# Patient Record
Sex: FEMALE | Race: OTHER | Employment: FULL TIME | URBAN - METROPOLITAN AREA
[De-identification: names, ages, dates, MRNs, and addresses within clinical notes are randomized per-mention and may not be internally consistent; named-entity substitution may affect disease eponyms.]

---

## 2020-09-25 ENCOUNTER — HOSPITAL ENCOUNTER (EMERGENCY)
Age: 40
Discharge: HOME | End: 2020-09-25
Attending: EMERGENCY MEDICINE
Payer: MEDICAID

## 2020-09-25 VITALS
TEMPERATURE: 98.2 F | SYSTOLIC BLOOD PRESSURE: 143 MMHG | DIASTOLIC BLOOD PRESSURE: 98 MMHG | WEIGHT: 178.57 LBS | OXYGEN SATURATION: 99 % | HEART RATE: 90 BPM | RESPIRATION RATE: 18 BRPM

## 2020-09-25 DIAGNOSIS — T78.40XA ALLERGIC REACTION, INITIAL ENCOUNTER: Primary | ICD-10-CM

## 2020-09-25 PROCEDURE — 74011636637 HC RX REV CODE- 636/637: Performed by: EMERGENCY MEDICINE

## 2020-09-25 PROCEDURE — 99283 EMERGENCY DEPT VISIT LOW MDM: CPT

## 2020-09-25 RX ORDER — EPINEPHRINE 0.3 MG/.3ML
0.3 INJECTION SUBCUTANEOUS
Qty: 1 SYRINGE | Refills: 0 | Status: SHIPPED | OUTPATIENT
Start: 2020-09-25 | End: 2020-09-25

## 2020-09-25 RX ORDER — PREDNISONE 20 MG/1
60 TABLET ORAL
Status: COMPLETED | OUTPATIENT
Start: 2020-09-25 | End: 2020-09-25

## 2020-09-25 RX ORDER — DIPHENHYDRAMINE HCL 25 MG
25 CAPSULE ORAL
Qty: 20 CAP | Refills: 0 | Status: SHIPPED | OUTPATIENT
Start: 2020-09-25 | End: 2020-10-05

## 2020-09-25 RX ORDER — PREDNISONE 20 MG/1
40 TABLET ORAL
Qty: 10 TAB | Refills: 0 | Status: SHIPPED | OUTPATIENT
Start: 2020-09-25 | End: 2020-09-30

## 2020-09-25 RX ADMIN — PREDNISONE 60 MG: 20 TABLET ORAL at 22:27

## 2020-09-26 NOTE — ED NOTES
Patient given discharge papers and instructions by primary RN. Patient verbalized understanding and stated not having any questions or concerns regarding her care. Patient ambulatory out of ED with friend.

## 2020-09-26 NOTE — ED PROVIDER NOTES
39-year-old female presenting to the ER with allergic reaction likely to kiwi fruit. Patient reports that she ate kiwi around 8 PM and approximately 10 to 15 minutes later started having symptoms. Patient reports itchiness and throat tingling. Reports facial flushing and skin itchiness. patient also reported nausea and some abdominal cramping. No vomiting or diarrhea. Patient reports some mild shortness of breath. Patient initially took an Guerraview and then called her doctor's nursing line. They advised her to take Benadryl. Patient took 50 mg of Benadryl and 30 minutes afterwards symptoms started to resolve. At this time patient reports itchiness and facial flushing resolved. Nausea abdominal cramping resolved. Shortness of breath and throat tingling resolved. No history of allergic reactions previously. Of note when patient was having facial flushing her daughter checked her temperature temporally and it read 100.5 °F.  Patient denied any preceding symptoms. And no recent fevers chills URI-like symptoms muscle aches dysuria or cough before the onset of lower allergic reaction  Temperature in the ER 98.2 °F patient had not taken any antipyretic medications. Past Medical History:   Diagnosis Date    Anemia        Past Surgical History:   Procedure Laterality Date    HX TUBAL LIGATION           History reviewed. No pertinent family history.     Social History     Socioeconomic History    Marital status:      Spouse name: Not on file    Number of children: Not on file    Years of education: Not on file    Highest education level: Not on file   Occupational History    Not on file   Social Needs    Financial resource strain: Not on file    Food insecurity     Worry: Not on file     Inability: Not on file    Transportation needs     Medical: Not on file     Non-medical: Not on file   Tobacco Use    Smoking status: Never Smoker    Smokeless tobacco: Never Used   Substance and Sexual Activity    Alcohol use: Yes    Drug use: Never    Sexual activity: Not on file   Lifestyle    Physical activity     Days per week: Not on file     Minutes per session: Not on file    Stress: Not on file   Relationships    Social connections     Talks on phone: Not on file     Gets together: Not on file     Attends Yarsanism service: Not on file     Active member of club or organization: Not on file     Attends meetings of clubs or organizations: Not on file     Relationship status: Not on file    Intimate partner violence     Fear of current or ex partner: Not on file     Emotionally abused: Not on file     Physically abused: Not on file     Forced sexual activity: Not on file   Other Topics Concern    Not on file   Social History Narrative    Not on file         ALLERGIES: Patient has no known allergies. Review of Systems   Constitutional: Negative for chills and fever. HENT: Positive for rhinorrhea, trouble swallowing and voice change. Negative for congestion and sore throat. Eyes: Negative for pain. Respiratory: Positive for shortness of breath. Cardiovascular: Negative for chest pain. Gastrointestinal: Positive for abdominal pain (cramping) and nausea. Negative for diarrhea and vomiting. Genitourinary: Negative for dysuria and flank pain. Musculoskeletal: Negative for back pain and neck pain. Skin: Positive for rash. Neurological: Negative for dizziness and headaches. Vitals:    09/25/20 2204   BP: (!) 143/98   Pulse: 90   Resp: 18   Temp: 98.2 °F (36.8 °C)   SpO2: 99%   Weight: 81 kg (178 lb 9.2 oz)            Physical Exam  Vitals signs and nursing note reviewed. Constitutional:       Appearance: She is well-developed. HENT:      Head: Normocephalic. Right Ear: Tympanic membrane, ear canal and external ear normal.      Left Ear: Tympanic membrane, ear canal and external ear normal.      Mouth/Throat:      Lips: Pink.       Mouth: Mucous membranes are moist.      Pharynx: Oropharynx is clear. No pharyngeal swelling or uvula swelling. Eyes:      Conjunctiva/sclera: Conjunctivae normal.   Neck:      Musculoskeletal: Normal range of motion and neck supple. Cardiovascular:      Rate and Rhythm: Normal rate and regular rhythm. Pulmonary:      Effort: Pulmonary effort is normal. No prolonged expiration or respiratory distress. Breath sounds: Normal breath sounds. No stridor. No wheezing. Abdominal:      General: Bowel sounds are normal.      Palpations: Abdomen is soft. Tenderness: There is no abdominal tenderness. Musculoskeletal: Normal range of motion. Skin:     General: Skin is warm. Capillary Refill: Capillary refill takes less than 2 seconds. Findings: No rash. Neurological:      Mental Status: She is alert and oriented to person, place, and time. Comments: No gross motor or sensory deficits          MDM  Number of Diagnoses or Management Options  Allergic reaction, initial encounter:   Diagnosis management comments: Patient presented with allergic reaction which is resolving after ministration of Benadryl at home. Allergic action likely to kiwi. At this time patient has no symptoms. Will start patient on steroids to prevent recurrence of her allergic reaction symptoms. Given follow-up information for allergist.  Advised to take Benadryl as needed for recurrent symptoms. Also gave prescription for an EpiPen due to the description of severity of symptoms before the Benadryl. This report of temperature 100.5 °F that resolved without anti-antipyretics. Patient is reporting facial flushing social with allergic reaction and they used temporal thermometer. This may have been false reading. As patient has normal temperature here and had no preceding symptoms before allergic reaction. No further evaluation for this needed. Discussed the discharge impression and any labs and the results with the patient.  Answered any questions and addressed any concerns. Discussed the importance of following up with their primary care provider and/or specialist.  Discussed signs or symptoms that would warrant return back to the ER for further evaluation. The patient is agreeable with discharge.            Procedures

## 2020-09-26 NOTE — ED TRIAGE NOTES
Patient arrives with c/o allergic reaction possibly to kiwi around 8pm. Reports rash and itchiness and was having SOB. Patient called the nurse line and was told to take 2 benadryl. States she feels better but still has the rash and itchiness. Also c/o fever 100.5, no fever in ED during triage and no meds taken at home for fever.

## 2021-12-06 ENCOUNTER — APPOINTMENT (OUTPATIENT)
Dept: CT IMAGING | Age: 41
End: 2021-12-06
Attending: PHYSICIAN ASSISTANT
Payer: COMMERCIAL

## 2021-12-06 ENCOUNTER — HOSPITAL ENCOUNTER (EMERGENCY)
Age: 41
Discharge: HOME | End: 2021-12-06
Attending: EMERGENCY MEDICINE
Payer: COMMERCIAL

## 2021-12-06 ENCOUNTER — APPOINTMENT (OUTPATIENT)
Dept: VASCULAR SURGERY | Age: 41
End: 2021-12-06
Attending: PHYSICIAN ASSISTANT
Payer: COMMERCIAL

## 2021-12-06 VITALS
TEMPERATURE: 98 F | OXYGEN SATURATION: 100 % | BODY MASS INDEX: 29.02 KG/M2 | DIASTOLIC BLOOD PRESSURE: 87 MMHG | HEART RATE: 92 BPM | HEIGHT: 64 IN | SYSTOLIC BLOOD PRESSURE: 140 MMHG | RESPIRATION RATE: 20 BRPM | WEIGHT: 170 LBS

## 2021-12-06 DIAGNOSIS — S86.811A STRAIN OF CALF MUSCLE, RIGHT, INITIAL ENCOUNTER: Primary | ICD-10-CM

## 2021-12-06 LAB
ALBUMIN SERPL-MCNC: 3.5 G/DL (ref 3.5–5)
ALBUMIN/GLOB SERPL: 1 {RATIO} (ref 1.1–2.2)
ALP SERPL-CCNC: 79 U/L (ref 45–117)
ALT SERPL-CCNC: 65 U/L (ref 12–78)
ANION GAP SERPL CALC-SCNC: 6 MMOL/L (ref 5–15)
AST SERPL-CCNC: 38 U/L (ref 15–37)
BASOPHILS # BLD: 0.1 K/UL (ref 0–0.1)
BASOPHILS NFR BLD: 1 % (ref 0–1)
BILIRUB SERPL-MCNC: 0.3 MG/DL (ref 0.2–1)
BUN SERPL-MCNC: 10 MG/DL (ref 6–20)
BUN/CREAT SERPL: 17 (ref 12–20)
CALCIUM SERPL-MCNC: 9.1 MG/DL (ref 8.5–10.1)
CHLORIDE SERPL-SCNC: 107 MMOL/L (ref 97–108)
CO2 SERPL-SCNC: 26 MMOL/L (ref 21–32)
CREAT SERPL-MCNC: 0.58 MG/DL (ref 0.55–1.02)
DIFFERENTIAL METHOD BLD: NORMAL
EOSINOPHIL # BLD: 0.2 K/UL (ref 0–0.4)
EOSINOPHIL NFR BLD: 2 % (ref 0–7)
ERYTHROCYTE [DISTWIDTH] IN BLOOD BY AUTOMATED COUNT: 13.2 % (ref 11.5–14.5)
GLOBULIN SER CALC-MCNC: 3.6 G/DL (ref 2–4)
GLUCOSE SERPL-MCNC: 100 MG/DL (ref 65–100)
HCT VFR BLD AUTO: 41.4 % (ref 35–47)
HGB BLD-MCNC: 14.1 G/DL (ref 11.5–16)
IMM GRANULOCYTES # BLD AUTO: 0 K/UL (ref 0–0.04)
IMM GRANULOCYTES NFR BLD AUTO: 0 % (ref 0–0.5)
LYMPHOCYTES # BLD: 2.2 K/UL (ref 0.8–3.5)
LYMPHOCYTES NFR BLD: 26 % (ref 12–49)
MCH RBC QN AUTO: 30.9 PG (ref 26–34)
MCHC RBC AUTO-ENTMCNC: 34.1 G/DL (ref 30–36.5)
MCV RBC AUTO: 90.6 FL (ref 80–99)
MONOCYTES # BLD: 0.8 K/UL (ref 0–1)
MONOCYTES NFR BLD: 10 % (ref 5–13)
NEUTS SEG # BLD: 5.3 K/UL (ref 1.8–8)
NEUTS SEG NFR BLD: 61 % (ref 32–75)
NRBC # BLD: 0 K/UL (ref 0–0.01)
NRBC BLD-RTO: 0 PER 100 WBC
PLATELET # BLD AUTO: 357 K/UL (ref 150–400)
PMV BLD AUTO: 10.2 FL (ref 8.9–12.9)
POTASSIUM SERPL-SCNC: 3.9 MMOL/L (ref 3.5–5.1)
PROT SERPL-MCNC: 7.1 G/DL (ref 6.4–8.2)
RBC # BLD AUTO: 4.57 M/UL (ref 3.8–5.2)
SODIUM SERPL-SCNC: 139 MMOL/L (ref 136–145)
WBC # BLD AUTO: 8.6 K/UL (ref 3.6–11)

## 2021-12-06 PROCEDURE — 73701 CT LOWER EXTREMITY W/DYE: CPT

## 2021-12-06 PROCEDURE — 99282 EMERGENCY DEPT VISIT SF MDM: CPT

## 2021-12-06 PROCEDURE — 80053 COMPREHEN METABOLIC PANEL: CPT

## 2021-12-06 PROCEDURE — 85025 COMPLETE CBC W/AUTO DIFF WBC: CPT

## 2021-12-06 PROCEDURE — 74011000636 HC RX REV CODE- 636: Performed by: EMERGENCY MEDICINE

## 2021-12-06 PROCEDURE — 36415 COLL VENOUS BLD VENIPUNCTURE: CPT

## 2021-12-06 PROCEDURE — 93971 EXTREMITY STUDY: CPT

## 2021-12-06 RX ORDER — ACETAMINOPHEN 500 MG
1000 TABLET ORAL
Qty: 20 TABLET | Refills: 0 | Status: SHIPPED | OUTPATIENT
Start: 2021-12-06

## 2021-12-06 RX ORDER — CYCLOBENZAPRINE HCL 10 MG
10 TABLET ORAL
Qty: 15 TABLET | Refills: 0 | Status: SHIPPED | OUTPATIENT
Start: 2021-12-06 | End: 2022-06-17

## 2021-12-06 RX ORDER — LIDOCAINE 50 MG/G
PATCH TOPICAL
Qty: 15 EACH | Refills: 0 | Status: SHIPPED | OUTPATIENT
Start: 2021-12-06 | End: 2022-06-17

## 2021-12-06 RX ORDER — IBUPROFEN 800 MG/1
800 TABLET ORAL
Qty: 20 TABLET | Refills: 0 | Status: SHIPPED | OUTPATIENT
Start: 2021-12-06 | End: 2021-12-13

## 2021-12-06 RX ADMIN — IOPAMIDOL 100 ML: 612 INJECTION, SOLUTION INTRAVENOUS at 16:53

## 2021-12-06 NOTE — DISCHARGE INSTRUCTIONS
It was a pleasure taking care of you at [facility] Emergency Department today. We know that when you come to [facility], you are entrusting us with your health, comfort, and safety. Our physicians and nurses honor that trust, and we truly appreciate the opportunity to care for you and your loved ones. We also value our feedback. If you receive a survey about your Emergency Department experience today, please fill it out. We care about our patients' feedback, and we listen to what you have to say. Thank you!

## 2021-12-06 NOTE — ED PROVIDER NOTES
EMERGENCY DEPARTMENT HISTORY AND PHYSICAL EXAM      Date: 12/6/2021  Patient Name: Brenda Flores    History of Presenting Illness     Chief Complaint   Patient presents with    Leg Pain     History Provided By: Patient    HPI: Brenda Flores, 42 y.o. female with medical history significant for anemia who presents via self to the ED with cc of acute moderate aching right posterior lower leg/calf pain X 2 weeks. Endorses that she felt something pull in the back of her leg while she was at work the other week, and then reinjured her leg today at work. States that she felt a \"ripping\" in her right calf when she was using her muscle to hold a large object at work. Endorses taking ibuprofen every now and then without relief. Denies numbness, tingling, focal weakness, redness, rash, wound, knee pain, ankle pain, other injuries. No history of DVT. PCP: Cecile Kang MD    There are no other complaints, changes, or physical findings at this time. No current facility-administered medications on file prior to encounter. Current Outpatient Medications on File Prior to Encounter   Medication Sig Dispense Refill    iron,carb/vit C/vit B12/folic (IRON 686 PLUS PO) Take  by mouth. Past History     Past Medical History:  Past Medical History:   Diagnosis Date    Anemia      Past Surgical History:  Past Surgical History:   Procedure Laterality Date    HX TUBAL LIGATION       Family History:  History reviewed. No pertinent family history. Social History:  Social History     Tobacco Use    Smoking status: Never Smoker    Smokeless tobacco: Never Used   Substance Use Topics    Alcohol use: Yes    Drug use: Never     Allergies:  No Known Allergies  Review of Systems   Review of Systems   Constitutional: Negative for activity change, appetite change, chills, diaphoresis, fatigue and fever. HENT: Negative. Eyes: Negative. Negative for visual disturbance. Respiratory: Negative.   Negative for cough and shortness of breath. Cardiovascular: Negative. Negative for chest pain, palpitations and leg swelling. Gastrointestinal: Negative. Negative for abdominal pain, diarrhea, nausea and vomiting. Genitourinary: Negative. Musculoskeletal: Positive for myalgias. Negative for arthralgias, back pain, gait problem, joint swelling, neck pain and neck stiffness. Skin: Negative. Negative for color change, pallor and wound. Neurological: Negative. Negative for weakness and numbness. Psychiatric/Behavioral: Negative. Physical Exam   Physical Exam  Vitals and nursing note reviewed. Constitutional:       General: She is not in acute distress. Appearance: Normal appearance. She is well-developed. She is not ill-appearing, toxic-appearing or diaphoretic. HENT:      Head: Normocephalic and atraumatic. Right Ear: Hearing and external ear normal.      Left Ear: Hearing and external ear normal.      Nose: Nose normal.   Eyes:      Conjunctiva/sclera: Conjunctivae normal.      Pupils: Pupils are equal, round, and reactive to light. Cardiovascular:      Pulses:           Dorsalis pedis pulses are 2+ on the right side and 2+ on the left side. Posterior tibial pulses are 2+ on the right side and 2+ on the left side. Pulmonary:      Effort: Pulmonary effort is normal. No respiratory distress. Musculoskeletal:         General: Normal range of motion. Cervical back: Normal range of motion. Right knee: Normal.      Right lower leg: Tenderness (calf) present. No swelling, deformity, lacerations or bony tenderness. Left lower leg: Normal. No edema. Right ankle: Normal.   Skin:     General: Skin is warm and dry. Capillary Refill: Capillary refill takes less than 2 seconds. Findings: No abrasion, abscess, bruising, ecchymosis, erythema, laceration, rash or wound. Neurological:      Mental Status: She is alert and oriented to person, place, and time.    Psychiatric: Behavior: Behavior normal.         Thought Content: Thought content normal.         Judgment: Judgment normal.       Diagnostic Study Results   Labs -     Recent Results (from the past 12 hour(s))   CBC WITH AUTOMATED DIFF    Collection Time: 12/06/21  3:49 PM   Result Value Ref Range    WBC 8.6 3.6 - 11.0 K/uL    RBC 4.57 3.80 - 5.20 M/uL    HGB 14.1 11.5 - 16.0 g/dL    HCT 41.4 35.0 - 47.0 %    MCV 90.6 80.0 - 99.0 FL    MCH 30.9 26.0 - 34.0 PG    MCHC 34.1 30.0 - 36.5 g/dL    RDW 13.2 11.5 - 14.5 %    PLATELET 792 882 - 569 K/uL    MPV 10.2 8.9 - 12.9 FL    NRBC 0.0 0  WBC    ABSOLUTE NRBC 0.00 0.00 - 0.01 K/uL    NEUTROPHILS 61 32 - 75 %    LYMPHOCYTES 26 12 - 49 %    MONOCYTES 10 5 - 13 %    EOSINOPHILS 2 0 - 7 %    BASOPHILS 1 0 - 1 %    IMMATURE GRANULOCYTES 0 0.0 - 0.5 %    ABS. NEUTROPHILS 5.3 1.8 - 8.0 K/UL    ABS. LYMPHOCYTES 2.2 0.8 - 3.5 K/UL    ABS. MONOCYTES 0.8 0.0 - 1.0 K/UL    ABS. EOSINOPHILS 0.2 0.0 - 0.4 K/UL    ABS. BASOPHILS 0.1 0.0 - 0.1 K/UL    ABS. IMM. GRANS. 0.0 0.00 - 0.04 K/UL    DF AUTOMATED     METABOLIC PANEL, COMPREHENSIVE    Collection Time: 12/06/21  3:49 PM   Result Value Ref Range    Sodium 139 136 - 145 mmol/L    Potassium 3.9 3.5 - 5.1 mmol/L    Chloride 107 97 - 108 mmol/L    CO2 26 21 - 32 mmol/L    Anion gap 6 5 - 15 mmol/L    Glucose 100 65 - 100 mg/dL    BUN 10 6 - 20 MG/DL    Creatinine 0.58 0.55 - 1.02 MG/DL    BUN/Creatinine ratio 17 12 - 20      GFR est AA >60 >60 ml/min/1.73m2    GFR est non-AA >60 >60 ml/min/1.73m2    Calcium 9.1 8.5 - 10.1 MG/DL    Bilirubin, total 0.3 0.2 - 1.0 MG/DL    ALT (SGPT) 65 12 - 78 U/L    AST (SGOT) 38 (H) 15 - 37 U/L    Alk. phosphatase 79 45 - 117 U/L    Protein, total 7.1 6.4 - 8.2 g/dL    Albumin 3.5 3.5 - 5.0 g/dL    Globulin 3.6 2.0 - 4.0 g/dL    A-G Ratio 1.0 (L) 1.1 - 2.2         Radiologic Studies -   CT LOW EXT RT W CONT   Final Result      1.  No fracture or drainable fluid collection or discrete soft tissue mass demonstrated      DUPLEX LOWER EXT VENOUS RIGHT    (Results Pending)     CT LOW EXT RT W CONT    Result Date: 12/6/2021  1. No fracture or drainable fluid collection or discrete soft tissue mass demonstrated    Medical Decision Making   I am the first provider for this patient. I reviewed the vital signs, available nursing notes, past medical history, past surgical history, family history and social history. Vital Signs-Reviewed the patient's vital signs. Patient Vitals for the past 24 hrs:   Temp Pulse Resp BP SpO2   12/06/21 1448 98 °F (36.7 °C) 92 20 (!) 140/87 100 %     Pulse Oximetry Analysis - 100% on RA (normal)    Records Reviewed: Nursing Notes, Old Medical Records, Previous Radiology Studies and Previous Laboratory Studies    Provider Notes (Medical Decision Making):   Patient presents with lower leg/ calf pain. DDx include mm strain, DVT, cellulitis, PAD. Will get Doppler Leg PRN. ED Course:   Initial assessment performed. The patients presenting problems have been discussed, and they are in agreement with the care plan formulated and outlined with them. I have encouraged them to ask questions as they arise throughout their visit. ED Course as of 12/06/21 1719   Mon Dec 06, 2021   1539 Vascular technician endorses patient is negative for DVT in right lower extremity, but she does endorse hyper vasculature mass to right calf. Will obtain imaging. [SM]      ED Course User Index  [SM] Lyly Odom PA-C       Progress Note:   Updated pt on all returned results and findings. Discussed the importance of proper follow up as referred below along with return precautions. Pt in agreement with the care plan and expresses agreement with and understanding of all items discussed. Disposition:  5:19 PM  I have discussed with patient their diagnosis, treatment, and follow up plan.  The patient agrees to follow up as outlined in discharge paperwork and also to return to the ED with any worsening. Cindi Bojorquez PA-C      PLAN:  1. Current Discharge Medication List      START taking these medications    Details   ibuprofen (MOTRIN) 800 mg tablet Take 1 Tablet by mouth every six (6) hours as needed for Pain for up to 7 days. Qty: 20 Tablet, Refills: 0  Start date: 12/6/2021, End date: 12/13/2021      acetaminophen (TYLENOL) 500 mg tablet Take 2 Tablets by mouth every six (6) hours as needed for Pain. Qty: 20 Tablet, Refills: 0  Start date: 12/6/2021      cyclobenzaprine (FLEXERIL) 10 mg tablet Take 1 Tablet by mouth three (3) times daily as needed for Muscle Spasm(s). Qty: 15 Tablet, Refills: 0  Start date: 12/6/2021      lidocaine (LIDODERM) 5 % Apply patch to the affected area for 12 hours a day and remove for 12 hours a day. Qty: 15 Each, Refills: 0  Start date: 12/6/2021           2. Follow-up Information     Follow up With Specialties Details Why Contact Info    [facility]  Schedule an appointment as soon as possible for a visit in 2 days As needed [facility] [address] DEPT Emergency Medicine Go to  As needed, If symptoms worsen [address] [facility]  [phone]        Return to ED if worse     Diagnosis     Clinical Impression:   1. Strain of calf muscle, right, initial encounter            Please note that this dictation was completed with Dragon, computer voice recognition software. Quite often unanticipated grammatical, syntax, homophones, and other interpretive errors are inadvertently transcribed by the computer software. Please disregard these errors. Additionally, please excuse any errors that have escaped final proofreading.

## 2021-12-06 NOTE — Clinical Note
[facility] EMERGENCY DEPT  [address] [address] [phone]    Work/School Note    Date: 12/6/2021    To Whom It May concern:    Adi Mars was seen and treated today in the emergency room by the following provider(s):  Attending Provider: Shar Hernandez MD  Physician Assistant: Maggi Rivero PA-C. Adi Mars is excused from work/school on 12/06/21 and 12/07/21. She is medically clear to return to work/school on 12/8/2021.        Sincerely,          Modesta Romo PA-C

## 2021-12-06 NOTE — ED NOTES
Discharge instructions were given to the patient by Vic York RN. The patient left the Emergency Department ambulatory, alert and oriented and in no acute distress with 4 prescriptions. The patient was encouraged to call or return to the ED for worsening issues or problems and was encouraged to schedule a follow up appointment for continuing care. The patient verbalized understanding of discharge instructions and prescriptions, all questions were answered. The patient has no further concerns at this time.

## 2021-12-06 NOTE — ED NOTES
Pt presents to ED ambulatory complaining of right posterior lower leg pain x 1 month. Pt is alert and oriented x 4, RR even and unlabored, skin is warm and dry. Assessment completed and pt updated on plan of care. Call bell in reach. Emergency Department Nursing Plan of Care       The Nursing Plan of Care is developed from the Nursing assessment and Emergency Department Attending provider initial evaluation. The plan of care may be reviewed in the ED Provider note.     The Plan of Care was developed with the following considerations:   Patient / Family readiness to learn indicated by:verbalized understanding  Persons(s) to be included in education: patient  Barriers to Learning/Limitations:No    Signed     [place]    12/6/2021   2:59 PM

## 2021-12-06 NOTE — ED TRIAGE NOTES
Pt in with calf pain x 1 month, states 1 week ago felt 'a rip' in right calf. Pt reports increased pain today. She denies known injury. She reports standing a lot at work. Pt taking ibuprofen 'every now and then' and using brace as well as ice to treat without relief.

## 2021-12-07 PROCEDURE — 93971 EXTREMITY STUDY: CPT | Performed by: INTERNAL MEDICINE

## 2022-06-16 ENCOUNTER — OFFICE VISIT (OUTPATIENT)
Dept: URGENT CARE | Age: 42
End: 2022-06-16
Payer: COMMERCIAL

## 2022-06-16 VITALS
TEMPERATURE: 98.2 F | OXYGEN SATURATION: 99 % | RESPIRATION RATE: 16 BRPM | SYSTOLIC BLOOD PRESSURE: 124 MMHG | DIASTOLIC BLOOD PRESSURE: 84 MMHG | HEART RATE: 77 BPM | BODY MASS INDEX: 30.04 KG/M2 | WEIGHT: 175 LBS

## 2022-06-16 DIAGNOSIS — L30.9 DERMATITIS: ICD-10-CM

## 2022-06-16 DIAGNOSIS — N93.9 VAGINAL BLEEDING: Primary | ICD-10-CM

## 2022-06-16 DIAGNOSIS — D64.9 ANEMIA, UNSPECIFIED TYPE: ICD-10-CM

## 2022-06-16 LAB
HCG URINE, QL. (POC): NEGATIVE
HGB BLD-MCNC: 9.9 G/DL
VALID INTERNAL CONTROL?: YES

## 2022-06-16 PROCEDURE — 81025 URINE PREGNANCY TEST: CPT | Performed by: NURSE PRACTITIONER

## 2022-06-16 PROCEDURE — 99203 OFFICE O/P NEW LOW 30 MIN: CPT | Performed by: NURSE PRACTITIONER

## 2022-06-16 PROCEDURE — 85018 HEMOGLOBIN: CPT | Performed by: NURSE PRACTITIONER

## 2022-06-16 RX ORDER — TRIAMCINOLONE ACETONIDE 1 MG/G
CREAM TOPICAL 2 TIMES DAILY
Qty: 45 G | Refills: 0 | Status: SHIPPED | OUTPATIENT
Start: 2022-06-16 | End: 2022-06-30

## 2022-06-16 NOTE — PROGRESS NOTES
Here for multiple concerns    Primarily:  1. Rash  Ongoing for 1 month  Itchy non painful  Location arms and stomach  Denies fever, chills or any known cause or exposures  Not resolved with home therapies    2. Vaginal bleeding  Onset 3 weeks ago. Intermittent  No cramping, abdominal pain, flank pain or other urinary symptoms  Hx of tubal ligation           Past Medical History:   Diagnosis Date    Anemia         Past Surgical History:   Procedure Laterality Date    HX TUBAL LIGATION           History reviewed. No pertinent family history. Social History     Socioeconomic History    Marital status:      Spouse name: Not on file    Number of children: Not on file    Years of education: Not on file    Highest education level: Not on file   Occupational History    Not on file   Tobacco Use    Smoking status: Never Smoker    Smokeless tobacco: Never Used   Substance and Sexual Activity    Alcohol use: Yes    Drug use: Never    Sexual activity: Not on file   Other Topics Concern    Not on file   Social History Narrative    Not on file     Social Determinants of Health     Financial Resource Strain:     Difficulty of Paying Living Expenses: Not on file   Food Insecurity:     Worried About Running Out of Food in the Last Year: Not on file    [place] of Food in the Last Year: Not on file   Transportation Needs:     Lack of Transportation (Medical): Not on file    Lack of Transportation (Non-Medical):  Not on file   Physical Activity:     Days of Exercise per Week: Not on file    Minutes of Exercise per Session: Not on file   Stress:     Feeling of Stress : Not on file   Social Connections:     Frequency of Communication with Friends and Family: Not on file    Frequency of Social Gatherings with Friends and Family: Not on file    Attends Sikh Services: Not on file    Active Member of Clubs or Organizations: Not on file    Attends Club or Organization Meetings: Not on file   Nilesh Barber Marital Status: Not on file   Intimate Partner Violence:     Fear of Current or Ex-Partner: Not on file    Emotionally Abused: Not on file    Physically Abused: Not on file    Sexually Abused: Not on file   Housing Stability:     Unable to Pay for Housing in the Last Year: Not on file    Number of [place] in the Last Year: Not on file    Unstable Housing in the Last Year: Not on file                ALLERGIES: Patient has no known allergies. Review of Systems   Constitutional: Negative for chills, fatigue and fever. Endocrine: Negative for polyuria. Genitourinary: Negative for dysuria, flank pain and pelvic pain. All other systems reviewed and are negative. Vitals:    06/16/22 1100   BP: 124/84   Pulse: 77   Resp: 16   Temp: 98.2 °F (36.8 °C)   SpO2: 99%   Weight: 175 lb (79.4 kg)       Physical Exam  Constitutional:       General: She is not in acute distress. Appearance: Normal appearance. She is not ill-appearing or toxic-appearing. HENT:      Mouth/Throat:      Mouth: Mucous membranes are moist.   Eyes:      Extraocular Movements: Extraocular movements intact. Cardiovascular:      Rate and Rhythm: Normal rate and regular rhythm. Pulses: Normal pulses. Heart sounds: Normal heart sounds. Abdominal:      General: Abdomen is flat. There is no distension. Palpations: Abdomen is soft. There is no mass. Tenderness: There is no abdominal tenderness. There is no guarding or rebound. Hernia: No hernia is present. Skin:     Findings: No rash. Neurological:      Mental Status: She is alert and oriented to person, place, and time. Psychiatric:         Mood and Affect: Mood normal.         Behavior: Behavior normal.         Thought Content:  Thought content normal.         [facility]     Differential Diagnosis; Clinical Impression; Plan:       CLINICAL IMPRESSION:  (N93.9) Vaginal bleeding  (primary encounter diagnosis)  (L30.9) Dermatitis  (D64.9) Anemia, unspecified type    Orders Placed This Encounter      triamcinolone acetonide (KENALOG) 0.1 % topical cream          Sig: Apply  to affected area two (2) times a day for 14 days. use thin layer           Dispense:  45 g          Refill:  0      Plan:  Rash appears eczematous/dermaitis like- start topical triamcinolone   Anemia- hemoglobin 9.9 today with vaginal bleeding. Not dizzy and not having any other symptoms. Advised she see her PCP or RISSA ASAP for further work up. Any heavy bleeding, dizziness or changes she is to go to ED immediately. Urine pregnancy negative today.               Procedures        Results for orders placed or performed in visit on 06/16/22   AMB POC URINE PREGNANCY TEST, VISUAL COLOR COMPARISON   Result Value Ref Range    VALID INTERNAL CONTROL POC Yes     HCG urine, Ql. (POC) Negative Negative   AMB POC HEMOGLOBIN (HGB)   Result Value Ref Range    Hemoglobin (POC) 9.9 G/DL

## 2022-06-16 NOTE — PATIENT INSTRUCTIONS
You need to follow up with  Your PCP or OBGYN ASAP. If you feel weak, have heavy bleeding or any new symptoms, go to emergency department immediately. Results for orders placed or performed in visit on 06/16/22   AMB POC URINE PREGNANCY TEST, VISUAL COLOR COMPARISON   Result Value Ref Range    VALID INTERNAL CONTROL POC Yes     HCG urine, Ql. (POC) Negative Negative   AMB POC HEMOGLOBIN (HGB)   Result Value Ref Range    Hemoglobin (POC) 9.9 G/DL          Anemia: Care Instructions  Your Care Instructions     Anemia is a low level of red blood cells, which carry oxygen throughout your body. Many things can cause anemia. Lack of iron is one of the most common causes. Your body needs iron to make hemoglobin, a substance in red blood cells that carries oxygen from the lungs to your body's cells. Without enough iron, the body produces fewer and smaller red blood cells. As a result, your body's cells do not get enough oxygen, and you feel tired and weak. And you may have trouble concentrating. Bleeding is the most common cause of a lack of iron. You may have heavy menstrual bleeding or bleeding caused by conditions such as ulcers, hemorrhoids, or cancer. Regular use of aspirin or other anti-inflammatory medicines (such as ibuprofen) also can cause bleeding in some people. A lack of iron in your diet also can cause anemia, especially at times when the body needs more iron, such as during pregnancy, infancy, and the teen years. Your doctor may have prescribed iron pills. It may take several months of treatment for your iron levels to return to normal. Your doctor also may suggest that you eat foods that are rich in iron, such as meat and beans. There are many other causes of anemia. It is not always due to a lack of iron. Finding the specific cause of your anemia will help your doctor find the right treatment for you. Follow-up care is a key part of your treatment and safety.  Be sure to make and go to all appointments, and call your doctor if you are having problems. It's also a good idea to know your test results and keep a list of the medicines you take. How can you care for yourself at home? · Take your medicines exactly as prescribed. Call your doctor if you think you are having a problem with your medicine. · If your doctor recommends iron pills, take them as directed:  ? Try to take the pills on an empty stomach about 1 hour before or 2 hours after meals. But you may need to take iron with food to avoid an upset stomach. ? Do not take antacids or drink milk or caffeine drinks (such as coffee, tea, or cola) at the same time or within 2 hours of the time that you take your iron. They can make it hard for your body to absorb the iron. ? Vitamin C (from food or supplements) helps your body absorb iron. Try taking iron pills with a glass of orange juice or some other food that is high in vitamin C, such as citrus fruits. ? Iron pills may cause stomach problems, such as heartburn, nausea, diarrhea, constipation, and cramps. Be sure to drink plenty of fluids, and include fruits, vegetables, and fiber in your diet each day. Iron pills often make your bowel movements dark or green. ? If you forget to take an iron pill, do not take a double dose of iron the next time you take a pill. ? Keep iron pills out of the reach of small children. An overdose of iron can be very dangerous. · Follow your doctor's advice about eating iron-rich foods. These include red meat, shellfish, poultry, eggs, beans, raisins, whole-grain bread, and leafy green vegetables. · Steam vegetables to help them keep their iron content. When should you call for help? Call 911 anytime you think you may need emergency care. For example, call if:    · You have symptoms of a heart attack. These may include:  ? Chest pain or pressure, or a strange feeling in the chest.  ? Sweating. ? Shortness of breath. ? Nausea or vomiting.   ? Pain, pressure, or a strange feeling in the back, neck, jaw, or upper belly or in one or both shoulders or arms. ? Lightheadedness or sudden weakness. ? A fast or irregular heartbeat. After you call 911, the  may tell you to chew 1 adult-strength or 2 to 4 low-dose aspirin. Wait for an ambulance. Do not try to drive yourself.     · You passed out (lost consciousness). Call your doctor now or seek immediate medical care if:    · You have new or increased shortness of breath.     · You are dizzy or lightheaded, or you feel like you may faint.     · Your fatigue and weakness continue or get worse.     · You have any abnormal bleeding, such as:  ? Nosebleeds. ? Vaginal bleeding that is different (heavier, more frequent, at a different time of the month) than what you are used to.  ? Bloody or black stools, or rectal bleeding. ? Bloody or pink urine. Watch closely for changes in your health, and be sure to contact your doctor if:    · You do not get better as expected. Where can you learn more? Go to http://www.lock.com/  Enter R301 in the search box to learn more about \"Anemia: Care Instructions. \"  Current as of: November 29, 2021               Content Version: 13.2  © 5366-8573 [employer]. Care instructions adapted under license by [employer] (which disclaims liability or warranty for this information). If you have questions about a medical condition or this instruction, always ask your healthcare professional. [place] [address] any warranty or liability for your use of this information.

## 2022-06-16 NOTE — PROGRESS NOTES
Abnormal bleeding note      Destini Higgins is a 36 y.o. female who complains of vaginal bleeding problems. Her current method of family planning is tubal ligation. She developed this problem approximately 2 months ago. She has had vaginal bleeding which she describes as heavy lasting up to a couple weeks. Pad or tampon count: changes every couple hours. Associated symptoms include cramping with passing clots. Alleviating factors: none  Aggravating factors: none      The patient is sexually active. Last Pap smear:was normal a couple years ago per pt. Reports normal monthly cycles until last month. Had two cycles in May, not sure of exact dates. First episode was early, then then had second episode of bleeding. These were not heavy. Then started bleeding at the beginning of the month, initially light, then had heavier bleeding last week, was passing clots (golf ball size), +cramping. Now bleeding overall lighter, still passing clots, still having cramping. Bleeding is heaviest in the evening hours, otherwise light during the day and overnight. Has h/o anemia, even when her periods were normal.    Has had rash on wrists, inner forearm, abdomen last month and this month. Taking benadryl and zyrtec. Review of CC shows that she was seen yesterday at urgent care. Hgb=9.9  UPT neg  Was prescribed kenalog for rash. Adv to f/u with gyn. Has appt in Sept to est care with PCP.     Her relevant past medical history:   Past Medical History:   Diagnosis Date    Anemia         Past Surgical History:   Procedure Laterality Date    HX CARPAL TUNNEL RELEASE Right 2016    HX TUBAL LIGATION  2008     Social History     Occupational History    Not on file   Tobacco Use    Smoking status: Never Smoker    Smokeless tobacco: Never Used   Vaping Use    Vaping Use: Never used   Substance and Sexual Activity    Alcohol use: Yes     Comment: about 1 a month    Drug use: Never    Sexual activity: Yes     Partners: Male     Birth control/protection: Surgical     Comment: tubal ligation     Family History   Problem Relation Age of Onset    Hypertension Mother     Heart Disease Father        No Known Allergies  Prior to Admission medications    Medication Sig Start Date End Date Taking? Authorizing Provider   ibuprofen (MOTRIN) 800 mg tablet  12/6/21  Yes Provider, Historical   ZINC PO Take  by mouth. Yes Provider, Historical   MAGNESIUM PO Take  by mouth. Yes Provider, Historical   cetirizine HCl (CETIRIZINE PO) Take  by mouth. Yes Provider, Historical   cholecalciferol, vitamin D3, (VITAMIN D3 PO) Take  by mouth. Yes Provider, Historical   diphenhydramine HCl (BENADRYL PO) Take  by mouth. Yes Provider, Historical   triamcinolone acetonide (KENALOG) 0.1 % topical cream Apply  to affected area two (2) times a day for 14 days. use thin layer 6/16/22 6/30/22 Yes Mina Gongora NP   acetaminophen (TYLENOL) 500 mg tablet Take 2 Tablets by mouth every six (6) hours as needed for Pain. 12/6/21  Yes Christelle Urbie PA-C   iron,carb/vit C/vit B12/folic (IRON 545 PLUS PO) Take  by mouth. Yes Other, MD Cecile   cyclobenzaprine (FLEXERIL) 10 mg tablet Take 1 Tablet by mouth three (3) times daily as needed for Muscle Spasm(s). Patient not taking: Reported on 6/17/2022 12/6/21   Garcia RIVERA PA-C   lidocaine (LIDODERM) 5 % Apply patch to the affected area for 12 hours a day and remove for 12 hours a day.   Patient not taking: Reported on 6/17/2022 12/6/21   Christelle Uribe PA-C        Review of Systems - History obtained from the patient  Constitutional: negative for weight loss, fever, night sweats  HEENT: negative for hearing loss, earache, congestion, snoring, sorethroat  CV: negative for chest pain, palpitations, edema  Resp: negative for cough, shortness of breath, wheezing  Breast: negative for breast lumps, nipple discharge, galactorrhea  GI: negative for change in bowel habits, abdominal pain, black or bloody stools  : negative for frequency, dysuria, hematuria  MSK: negative for back pain, joint pain, muscle pain  Skin: see HPI  Neuro: negative for dizziness, headache, confusion, weakness  Psych: negative for anxiety, depression, change in mood  Heme/lymph: negative for bleeding, bruising, pallor      Objective:    Visit Vitals  /88   Pulse 85   Ht 5' 4\" (1.626 m)   Wt 175 lb (79.4 kg)   LMP 06/02/2022   BMI 30.04 kg/m²          PHYSICAL EXAMINATION    Constitutional  · Appearance: well-nourished, well developed, alert, in no acute distress    HENT  · Head and Face: appears normal    Neck  · Inspection/Palpation: normal appearance, no masses or tenderness  · Lymph Nodes: no lymphadenopathy present  · Thyroid: gland size normal, nontender, no nodules or masses present on palpation      Gastrointestinal  · Abdominal Examination: abdomen non-tender to palpation, no masses present, rash over upper abd  · Liver and spleen: no hepatomegaly present, spleen not palpable  · Hernias: no hernias identified    Genitourinary  · External Genitalia: normal appearance for age, no discharge present, no tenderness present, no inflammatory lesions present, no masses present, no atrophy present  · Vagina: normal vaginal vault without central or paravaginal defects, no discharge present, no inflammatory lesions present, no masses present; scant blood  · Bladder: non-tender to palpation  · Urethra: appears normal  · Cervix: normal   · Uterus: normal size, shape and consistency  · Adnexa: no adnexal tenderness present, no adnexal masses present  · Perineum: perineum within normal limits, no evidence of trauma, no rashes or skin lesions present  · Anus: anus within normal limits, no hemorrhoids present  · Inguinal Lymph Nodes: no lymphadenopathy present    Skin  · General Inspection: no rash, no lesions identified    Neurologic/Psychiatric  · Mental Status:  · Orientation: grossly oriented to person, place and time  · Mood and Affect: mood normal, affect appropriate    Assessment:   AUB  anemia    Plan:   CBC, ferritin, iron profile, TSH  Menstrual calendar  Provera x10d  Iron PO  RTO ~2 months        Orders Placed This Encounter    CBC W/O DIFF     Standing Status:   Future     Number of Occurrences:   1     Standing Expiration Date:   12/17/2022    FERRITIN     Standing Status:   Future     Number of Occurrences:   1     Standing Expiration Date:   12/17/2022    IRON PROFILE     Standing Status:   Future     Number of Occurrences:   1     Standing Expiration Date:   12/17/2022    TSH 3RD GENERATION     Standing Status:   Future     Number of Occurrences:   1     Standing Expiration Date:   6/17/2023    medroxyPROGESTERone (PROVERA) 10 mg tablet     Sig: Take 1 Tablet by mouth daily for 10 days. Dispense:  10 Tablet     Refill:  0    ferrous sulfate (IRON) 325 mg (65 mg iron) EC tablet     Sig: Take 1 Tablet by mouth three (3) times daily.      Dispense:  90 Tablet     Refill:  1

## 2022-06-17 ENCOUNTER — OFFICE VISIT (OUTPATIENT)
Dept: OBGYN CLINIC | Age: 42
End: 2022-06-17
Payer: COMMERCIAL

## 2022-06-17 VITALS
HEIGHT: 64 IN | BODY MASS INDEX: 29.88 KG/M2 | SYSTOLIC BLOOD PRESSURE: 128 MMHG | HEART RATE: 85 BPM | DIASTOLIC BLOOD PRESSURE: 88 MMHG | WEIGHT: 175 LBS

## 2022-06-17 DIAGNOSIS — D64.9 ANEMIA, UNSPECIFIED TYPE: ICD-10-CM

## 2022-06-17 DIAGNOSIS — N93.9 ABNORMAL UTERINE BLEEDING (AUB): Primary | ICD-10-CM

## 2022-06-17 LAB
COMMENT, HOLDF: NORMAL
ERYTHROCYTE [DISTWIDTH] IN BLOOD BY AUTOMATED COUNT: 14.1 % (ref 11.5–14.5)
FERRITIN SERPL-MCNC: 6 NG/ML (ref 8–252)
HCT VFR BLD AUTO: 37 % (ref 35–47)
HGB BLD-MCNC: 12.2 G/DL (ref 11.5–16)
IRON SATN MFR SERPL: 7 % (ref 20–50)
IRON SERPL-MCNC: 27 UG/DL (ref 35–150)
MCH RBC QN AUTO: 29.2 PG (ref 26–34)
MCHC RBC AUTO-ENTMCNC: 33 G/DL (ref 30–36.5)
MCV RBC AUTO: 88.5 FL (ref 80–99)
NRBC # BLD: 0 K/UL (ref 0–0.01)
NRBC BLD-RTO: 0 PER 100 WBC
PLATELET # BLD AUTO: 341 K/UL (ref 150–400)
PMV BLD AUTO: 10.3 FL (ref 8.9–12.9)
RBC # BLD AUTO: 4.18 M/UL (ref 3.8–5.2)
SAMPLES BEING HELD,HOLD: NORMAL
TIBC SERPL-MCNC: 390 UG/DL (ref 250–450)
TSH SERPL DL<=0.05 MIU/L-ACNC: 1.62 UIU/ML (ref 0.36–3.74)
WBC # BLD AUTO: 5 K/UL (ref 3.6–11)

## 2022-06-17 PROCEDURE — 99204 OFFICE O/P NEW MOD 45 MIN: CPT | Performed by: OBSTETRICS & GYNECOLOGY

## 2022-06-17 RX ORDER — FERROUS SULFATE 325(65) MG
325 TABLET, DELAYED RELEASE (ENTERIC COATED) ORAL 3 TIMES DAILY
Qty: 90 TABLET | Refills: 1 | Status: SHIPPED | OUTPATIENT
Start: 2022-06-17 | End: 2022-10-05 | Stop reason: SDUPTHER

## 2022-06-17 RX ORDER — MEDROXYPROGESTERONE ACETATE 10 MG/1
10 TABLET ORAL DAILY
Qty: 10 TABLET | Refills: 0 | Status: SHIPPED | OUTPATIENT
Start: 2022-06-17 | End: 2022-06-27

## 2022-06-17 RX ORDER — IBUPROFEN 800 MG/1
TABLET ORAL
COMMUNITY
Start: 2021-12-06

## 2022-07-13 ENCOUNTER — TELEPHONE (OUTPATIENT)
Dept: OBGYN CLINIC | Age: 42
End: 2022-07-13

## 2022-07-13 NOTE — TELEPHONE ENCOUNTER
36year old pt calling because she has been having heavy bleeding since 6/28 pt was prescribed Provera 10mg on 6/17 and pt states since she finished taking the pills she has started bleeding again and has not stopped. Pt states she has been bleeding through a tampon/pad every 30 minutes. Advised pt that she need to go to ER if she is bleeding that heavy. Pt refuses to go to ER because they do not do anything for her. Scheduled an appointment for 8/18    Please advise.

## 2022-07-14 ENCOUNTER — OFFICE VISIT (OUTPATIENT)
Dept: OBGYN CLINIC | Age: 42
End: 2022-07-14
Payer: COMMERCIAL

## 2022-07-14 VITALS
HEART RATE: 85 BPM | DIASTOLIC BLOOD PRESSURE: 89 MMHG | WEIGHT: 175 LBS | BODY MASS INDEX: 30.04 KG/M2 | SYSTOLIC BLOOD PRESSURE: 134 MMHG

## 2022-07-14 DIAGNOSIS — N93.9 ABNORMAL UTERINE BLEEDING (AUB): Primary | ICD-10-CM

## 2022-07-14 DIAGNOSIS — R10.2 PELVIC PAIN: ICD-10-CM

## 2022-07-14 PROCEDURE — 99214 OFFICE O/P EST MOD 30 MIN: CPT | Performed by: OBSTETRICS & GYNECOLOGY

## 2022-07-14 PROCEDURE — 58100 BIOPSY OF UTERUS LINING: CPT | Performed by: OBSTETRICS & GYNECOLOGY

## 2022-07-14 RX ORDER — MEDROXYPROGESTERONE ACETATE 10 MG/1
10 TABLET ORAL DAILY
Qty: 30 TABLET | Refills: 0 | Status: SHIPPED | OUTPATIENT
Start: 2022-07-14

## 2022-07-14 RX ORDER — IBUPROFEN 800 MG/1
800 TABLET ORAL
Qty: 30 TABLET | Refills: 1 | Status: SHIPPED | OUTPATIENT
Start: 2022-07-14 | End: 2022-07-19 | Stop reason: SDUPTHER

## 2022-07-14 NOTE — TELEPHONE ENCOUNTER
Pt returning phone call, spoke with MD nurse to see where I could put pt at on the schedule and they told me to advise pt to check [address] for a message from Dr. Phu Matute or nurse in regards to her problem.

## 2022-07-14 NOTE — TELEPHONE ENCOUNTER
Sounds like withdrawal bleed from her provera. Rec that she resume Provera. I have sent RX. Can make office appt for EMB.

## 2022-07-14 NOTE — PROGRESS NOTES
Problem Visit-Complete    Chief Complaint   Endometrial Biopsy      HPI  Edvin La is a 36 y.o. female who presents for the evaluation of heavy long period. Patient's last menstrual period was 06/28/2022. Was seen 6/17, irregular cycles. Was given Provera, took for 10d. Bleeding started after Provera. Initially normal, then heavier for a few days. Fluctuated back to normal flow over the following week. Has been consistently heavier this week. Saturating pad about every hour, quarter-sized clots.  +cramping/pelvic pain  Taking ibuprofen 400mg 2-3x/d over the last two days. She also complains of ovary pain and pelvic cramping that radiates to her lower back. Some nausea, no vomiting. No CP, SOB. Past Medical History:   Diagnosis Date    Anemia      Past Surgical History:   Procedure Laterality Date    HX CARPAL TUNNEL RELEASE Right 2016    HX TUBAL LIGATION  2008     Social History     Occupational History    Not on file   Tobacco Use    Smoking status: Never Smoker    Smokeless tobacco: Never Used   Vaping Use    Vaping Use: Never used   Substance and Sexual Activity    Alcohol use: Yes     Comment: about 1 a month    Drug use: Never    Sexual activity: Yes     Partners: Male     Birth control/protection: Surgical     Comment: tubal ligation     Family History   Problem Relation Age of Onset    Hypertension Mother     Heart Disease Father        No Known Allergies  Prior to Admission medications    Medication Sig Start Date End Date Taking? Authorizing Provider   medroxyPROGESTERone (PROVERA) 10 mg tablet Take 1 Tablet by mouth daily. 7/14/22  Yes Grace Momin MD   ibuprofen (MOTRIN) 800 mg tablet  12/6/21  Yes Provider, Historical   ZINC PO Take  by mouth. Yes Provider, Historical   MAGNESIUM PO Take  by mouth. Yes Provider, Historical   cetirizine HCl (CETIRIZINE PO) Take  by mouth. Yes Provider, Historical   cholecalciferol, vitamin D3, (VITAMIN D3 PO) Take  by mouth. Yes Provider, Historical   diphenhydramine HCl (BENADRYL PO) Take  by mouth. Yes Provider, Historical   ferrous sulfate (IRON) 325 mg (65 mg iron) EC tablet Take 1 Tablet by mouth three (3) times daily. 6/17/22  Yes Kamilla Griffin MD   acetaminophen (TYLENOL) 500 mg tablet Take 2 Tablets by mouth every six (6) hours as needed for Pain. 12/6/21  Yes Chris Tapia PA-C   iron,carb/vit C/vit B12/folic (IRON 581 PLUS PO) Take  by mouth.   Patient not taking: Reported on 7/14/2022    Other, MD Cecile          Objective:  Visit Vitals  /89   Pulse 85   Wt 175 lb (79.4 kg)   LMP 06/28/2022   BMI 30.04 kg/m²       Physical Exam:     Constitutional  · Appearance: well-nourished, well developed, alert, in no acute distress    HENT  · Head and Face: appears normal    Chest  · Respiratory Effort: breathing unlabored  · Auscultation: normal breath sounds    Cardiovascular  · Heart:  · Auscultation: regular rate and rhythm without murmur    Gastrointestinal  · Abdominal Examination: abdomen non-tender to palpation, normal bowel sounds, no masses present  · Liver and spleen: no hepatomegaly present, spleen not palpable  · Hernias: no hernias identified    Genitourinary  · External Genitalia: normal appearance for age, no discharge present, no tenderness present, no inflammatory lesions present, no masses present, no atrophy present  · Vagina: normal vaginal vault without central or paravaginal defects, no discharge present, no inflammatory lesions present, no masses present; small amount dark menstrual blood  · Bladder: non-tender to palpation  · Urethra: appears normal  · Cervix: normal   · Uterus: sounds to 9cm  · Perineum: perineum within normal limits, no evidence of trauma, no rashes or skin lesions present  · Anus: anus within normal limits, no hemorrhoids present      Skin  · General Inspection: no rash, no lesions identified    Neurologic/Psychiatric  · Mental Status:  · Orientation: grossly oriented to person, place and time  · Mood and Affect: mood normal, affect appropriate    Assessment:  AUB. Plan:   Provera eRX sent this AM  ibpfn 800mg q8hrs  EMB done (see note below)  Cbc today  Will schedule pelvic US    Orders Placed This Encounter    BIOPSY OF UTERUS LINING    CBC W/O DIFF     Standing Status:   Future     Number of Occurrences:   1     Standing Expiration Date:   2023    ibuprofen (MOTRIN) 800 mg tablet     Sig: Take 1 Tablet by mouth every eight (8) hours as needed for Pain (bleeding). Dispense:  30 Tablet     Refill:  1    SURGICAL PATHOLOGY     Order Specific Question:   Type of Specimen and Locations? Answer:   uterine lining     Order Specific Question:   Patient's clinical history:     Answer:   abnormal uterine bleeding                 [facility]  OFFICE PROCEDURE PROGRESS NOTE        Chart reviewed for the following:   Checo ACUÑA RN, have reviewed the History, Physical and updated the Allergic reactions for [place] performed immediately prior to start of procedure:   Preeti Mccollum RN, have performed the following reviews on Chloe Panda prior to the start of the procedure:            * Patient was identified by name and date of birth   * Agreement on procedure being performed was verified  * Risks and Benefits explained to the patient  * Consent was signed and verified     Time: 1430    Date of procedure: 2022    Procedure performed by:  Eric Peter MD    Provider assisted by: Sergey Atkinson RN    Patient assisted by: self    How tolerated by patient: tolerated the procedure well with no complications        Procedure note: Endometrial biopsy    Chloe Panda is a ,  36 y.o. female / Patient's last menstrual period was 2022. The patient has a history of There were no encounter diagnoses. and presents for an endometrial biopsy.    Indications:   After the indications, risks, benefits, and alternatives to performing an endometrial biopsy were explained to the patient, her questions were answered and informed consent was obtained. Procedure: The patient was placed on the table in the dorsal lithotomy position. A speculum was placed in the vagina. The cervix was visualized and prepped with zephrin. A tenaculum was not placed on the anterior lip of the cervix for traction. It was not necessary to dilate the cervix. A pipelle was passed through the endocervical canal without difficulty. The uterus was sounded to 9 cm's. A moderate amount of blood and tissue was returned. This tissue was placed in formalin and sent to pathology. It was felt that an adequate sample was obtained. The patient tolerated the procedure well and she reported mild to moderate cramping. The speculum was removed. Post Procedural Status: The patient was observed for 10 minutes after the procedure. She had mild cramping at the time of discharge. There were no complications. The patient was discharged in stable condition.

## 2022-07-15 LAB
ERYTHROCYTE [DISTWIDTH] IN BLOOD BY AUTOMATED COUNT: 15.9 % (ref 11.5–14.5)
HCT VFR BLD AUTO: 40.7 % (ref 35–47)
HGB BLD-MCNC: 13.3 G/DL (ref 11.5–16)
MCH RBC QN AUTO: 30.6 PG (ref 26–34)
MCHC RBC AUTO-ENTMCNC: 32.7 G/DL (ref 30–36.5)
MCV RBC AUTO: 93.8 FL (ref 80–99)
NRBC # BLD: 0 K/UL (ref 0–0.01)
NRBC BLD-RTO: 0 PER 100 WBC
PLATELET # BLD AUTO: 371 K/UL (ref 150–400)
PMV BLD AUTO: 11 FL (ref 8.9–12.9)
RBC # BLD AUTO: 4.34 M/UL (ref 3.8–5.2)
WBC # BLD AUTO: 7.6 K/UL (ref 3.6–11)

## 2022-07-18 NOTE — PROGRESS NOTES
Problem Visit-Complete    Chief Complaint   Ultrasound      HPI  Monica Sales is a 36 y.o. female who presents for the evaluation of AUB. Patient's last menstrual period was 06/28/2022. Patient seen 6/17/2022 for AUB. Started approximately 2 months prior. Was given Provera x10 days. Return to the office on 7/14/2022. She reported taking the Provera as prescribed. Did trigger a withdrawal bleed, however she continued with heavy bleeding. She was given an additional Provera scheduled to return to the office for an ultrasound. Labs drawn at that visit showed a normal hemoglobin of 13.3. An endometrial biopsy was also obtained. These results are still pending. She reports that her bleeding has stopped on Provera which she is currently taking. Lab Results   Component Value Date/Time    Hemoglobin (POC) 9.9 06/16/2022 11:40 AM    HGB 13.3 07/14/2022 04:49 PM         Today's ultrasound showed:  TRANSVAGINAL ULTRASOUND PERFORMED  UTERUS IS ANTEVERTED, NORMAL IN SIZE AND HETEROGENEOUS IN ECHOGENICITY. ENDOMETRIUM MEASURES 6-7MM IN THICKNESS. AN ECHOGENIC AREA IS SEEN WITHIN THE ENDOMETRIUM. A  POLYP OR MASS CANNOT BE RULED OUT. RIGHT OVARY APPEARS WITHIN NORMAL LIMITS. LEFT OVARY APPEARS WITHIN NORMAL LIMITS. BILATERAL FOLLICULAR CYSTS ARE SEEN. NO FREE FLUID SEEN IN THE CDS. The patient denies any bleeding today.     Past Medical History:   Diagnosis Date    Anemia      Past Surgical History:   Procedure Laterality Date    HX CARPAL TUNNEL RELEASE Right 2016    HX TUBAL LIGATION  2008     Social History     Occupational History    Not on file   Tobacco Use    Smoking status: Never Smoker    Smokeless tobacco: Never Used   Vaping Use    Vaping Use: Never used   Substance and Sexual Activity    Alcohol use: Yes     Comment: about 1 a month    Drug use: Never    Sexual activity: Yes     Partners: Male     Birth control/protection: Surgical     Comment: tubal ligation     Family History Problem Relation Age of Onset    Hypertension Mother     Heart Disease Father        No Known Allergies  Prior to Admission medications    Medication Sig Start Date End Date Taking? Authorizing Provider   medroxyPROGESTERone (PROVERA) 10 mg tablet Take 1 Tablet by mouth daily. 7/14/22  Yes Sybil Krueger MD   ibuprofen (MOTRIN) 800 mg tablet  12/6/21  Yes Provider, Historical   ZINC PO Take  by mouth. Yes Provider, Historical   MAGNESIUM PO Take  by mouth. Yes Provider, Historical   cetirizine HCl (CETIRIZINE PO) Take  by mouth. Yes Provider, Historical   cholecalciferol, vitamin D3, (VITAMIN D3 PO) Take  by mouth. Yes Provider, Historical   diphenhydramine HCl (BENADRYL PO) Take  by mouth. Yes Provider, Historical   ferrous sulfate (IRON) 325 mg (65 mg iron) EC tablet Take 1 Tablet by mouth three (3) times daily. 6/17/22  Yes Sybil Krueger MD   acetaminophen (TYLENOL) 500 mg tablet Take 2 Tablets by mouth every six (6) hours as needed for Pain. 12/6/21  Yes Sammy Schultz PA-C   ibuprofen (MOTRIN) 800 mg tablet Take 1 Tablet by mouth every eight (8) hours as needed for Pain (bleeding). 7/14/22 7/19/22  Sybil Krueger MD   iron,carb/vit C/vit B12/folic (IRON 288 PLUS PO) Take  by mouth. Patient not taking: Reported on 7/19/2022    Other, MD Cecile            Objective:  Visit Vitals  /87   Pulse 91   Wt 178 lb (80.7 kg)   LMP 06/28/2022   BMI 30.55 kg/m²       Physical Exam:     Constitutional  · Appearance: well-nourished, well developed, alert, in no acute distress    HENT  · Head and Face: appears normal    Skin  · General Inspection: no rash, no lesions identified    Neurologic/Psychiatric  · Mental Status:  · Orientation: grossly oriented to person, place and time  · Mood and Affect: mood normal, affect appropriate    Assessment:  AUB. Improved on Provera. US suspicious for endometrial polyp  EMB pending (done last visit 7/14)    Plan:   SIS  Cont provera.

## 2022-07-19 ENCOUNTER — OFFICE VISIT (OUTPATIENT)
Dept: OBGYN CLINIC | Age: 42
End: 2022-07-19

## 2022-07-19 VITALS
SYSTOLIC BLOOD PRESSURE: 121 MMHG | HEART RATE: 91 BPM | WEIGHT: 178 LBS | DIASTOLIC BLOOD PRESSURE: 87 MMHG | BODY MASS INDEX: 30.55 KG/M2

## 2022-07-19 DIAGNOSIS — R93.5 ABNORMAL ENDOMETRIAL ULTRASOUND: ICD-10-CM

## 2022-07-19 DIAGNOSIS — N93.9 ABNORMAL UTERINE BLEEDING (AUB): Primary | ICD-10-CM

## 2022-07-19 PROCEDURE — 99214 OFFICE O/P EST MOD 30 MIN: CPT | Performed by: OBSTETRICS & GYNECOLOGY

## 2022-07-20 LAB
CPT DISCLAIMER: NORMAL
DIAGNOSIS SYNOPSIS:: NORMAL
DX ICD CODE: NORMAL
PATH REPORT.FINAL DX SPEC: NORMAL
PATH REPORT.GROSS SPEC: NORMAL
PATH REPORT.RELEVANT HX SPEC: NORMAL
PATH REPORT.SITE OF ORIGIN SPEC: NORMAL
PATHOLOGIST NAME: NORMAL
PAYMENT PROCEDURE: NORMAL

## 2022-08-22 NOTE — PROGRESS NOTES
AUB.  Nl cycles until May. Had 2 cycles in May (early then end of the month), not heavy . AUB started in June. Seen 6/17/22 for NPP. Given Provera x10d. Labs drawn: TSH nl; not anemic, but iron levels low. Seen for f/up 7/14/22:  Reported wihdrawl bleed, but heavy with significant cramping. EMB (7/14/22) weakly prolif with stromal and glandular breakdown. Given Provera again. US (7/19/22) showed echogenic area in endometrium, cannot r/o polyp. Had period ~7/28. Not bleeding now. Not taking Provera now. Returns today for SIS.    -> SIS today  Shows endometrial polyp right posterolateral, with ?second polyp left lat wall    Rec HS/polypectomy. Also discussed IUD. Wants to see how she does with just the polypectomy. Risks and benefits reviewed including: bleeding; infection; uterine perforation with injury to surrounding structures including bowel, bladder, ureters, muscles, vessels, nerves, possibly requiring additional surgery to repair or remove; persistence or recurrence of symptoms; need for additional surgery or treatment depending on results. Questions answered.         [facility]  OFFICE PROCEDURE PROGRESS NOTE    Chart reviewed for the following:   I, Kenton Page, RN, have reviewed the History, Physical and updated the Allergic reactions for [place] performed immediately prior to start of procedure:   Sal Cordero RN, have performed the following reviews on Levon Britt prior to the start of the procedure:            * Patient was identified by name and date of birth   * Agreement on procedure being performed was verified  * Risks and Benefits explained to the patient  * Procedure site verified and marked as necessary  * Patient was positioned for comfort  * Consent was signed and verified     Time: 1130  Date of procedure: 8/22/2022  Procedure performed by:  Crystal Mars MD  Provider assisted by: Liset Hinds RN  Patient assisted by: self  How tolerated by patient: tolerated the procedure well with no complications  Post Procedural Pain Scale:   Comments: none  SONOHYSTEROGRAPHY    Chloe Panda is a ,  36 y.o. female / whose No LMP recorded. was on . , presents for a sonohysterography. The indications for this procedure were reviewed with the patient. The procedure was explained in detail and all questions were answered. Procedure: The patient was placed in the lithotomy position. A graves speculum was introduced into the vagina and the cervix was visualized. The cervix was prepped with iodine solution. A [employer] Hysterography catheter was then introduced into the uterine cavity and the speculum was removed. Sterile sonohysterography with 3D Reconstruction was performed. The endometrial cavity was distended with sterile saline. The findings are as follows: endometrial polyp arising from the right posterolateral wall with probable second polyp from left side wall. TRANSVAGINAL ULTRASOUND PERFORMEDUTERUS IS ANTEVERTED, NORMAL IN SIZE AND HETEROGENEOUS INECHOGENICITY. ENDOMETRIUM MEASURES 5-6MM IN THICKNESS. THE SIS WAS COMPLETED. FOLLOWING INSERTION OF THE CATHETER INTO THE UTERUS, AND INJECTION OF  SALINE THE ENDOMETRIAL CAVITY DISTENDED. A POSTERIOR LEFT POLYP IS SEEN THAT MEASURES 25 X 4 X 7MM. RIGHT OVARY APPEARS WITHIN NORMAL LIMITS. LEFT OVARY APPEARS WITHIN NORMAL LIMITS. A FOLLICULAR CYST IS SEEN. NO FREE FLUID SEEN IN THE CDS. The patient tolerated the procedure well without complication, and was discharged to home.

## 2022-08-23 ENCOUNTER — OFFICE VISIT (OUTPATIENT)
Dept: OBGYN CLINIC | Age: 42
End: 2022-08-23

## 2022-08-23 DIAGNOSIS — N93.9 ABNORMAL UTERINE BLEEDING (AUB): Primary | ICD-10-CM

## 2022-08-23 DIAGNOSIS — N84.0 ENDOMETRIAL POLYP: ICD-10-CM

## 2022-08-30 RX ORDER — TRAMADOL HYDROCHLORIDE 50 MG/1
TABLET ORAL
COMMUNITY
Start: 2022-08-18

## 2022-09-08 ENCOUNTER — DOCUMENTATION ONLY (OUTPATIENT)
Dept: FAMILY MEDICINE CLINIC | Age: 42
End: 2022-09-08

## 2022-09-09 ENCOUNTER — DOCUMENTATION ONLY (OUTPATIENT)
Dept: FAMILY MEDICINE CLINIC | Age: 42
End: 2022-09-09

## 2022-09-09 NOTE — PROGRESS NOTES
Attempted to call patient to advise that apt for today needs to be cancelled due to provider emergency however phone continued to ring with no option to LVM

## 2022-10-05 ENCOUNTER — OFFICE VISIT (OUTPATIENT)
Dept: FAMILY MEDICINE CLINIC | Age: 42
End: 2022-10-05
Payer: COMMERCIAL

## 2022-10-05 VITALS
TEMPERATURE: 98 F | OXYGEN SATURATION: 98 % | HEART RATE: 89 BPM | SYSTOLIC BLOOD PRESSURE: 122 MMHG | DIASTOLIC BLOOD PRESSURE: 86 MMHG | WEIGHT: 179.4 LBS | HEIGHT: 64 IN | BODY MASS INDEX: 30.63 KG/M2 | RESPIRATION RATE: 12 BRPM

## 2022-10-05 DIAGNOSIS — N93.8 DUB (DYSFUNCTIONAL UTERINE BLEEDING): Primary | ICD-10-CM

## 2022-10-05 DIAGNOSIS — L30.9 ECZEMA, UNSPECIFIED TYPE: ICD-10-CM

## 2022-10-05 DIAGNOSIS — Z13.29 SCREENING FOR THYROID DISORDER: ICD-10-CM

## 2022-10-05 DIAGNOSIS — Z83.3 FAMILY HISTORY OF DIABETES MELLITUS (DM): ICD-10-CM

## 2022-10-05 DIAGNOSIS — K59.00 CONSTIPATION, UNSPECIFIED CONSTIPATION TYPE: ICD-10-CM

## 2022-10-05 DIAGNOSIS — S82.65XD CLOSED NONDISPLACED FRACTURE OF LATERAL MALLEOLUS OF LEFT FIBULA WITH ROUTINE HEALING, SUBSEQUENT ENCOUNTER: ICD-10-CM

## 2022-10-05 DIAGNOSIS — Z13.220 SCREENING, LIPID: ICD-10-CM

## 2022-10-05 DIAGNOSIS — D50.0 IRON DEFICIENCY ANEMIA DUE TO CHRONIC BLOOD LOSS: ICD-10-CM

## 2022-10-05 PROCEDURE — 99204 OFFICE O/P NEW MOD 45 MIN: CPT | Performed by: NURSE PRACTITIONER

## 2022-10-05 RX ORDER — POLYETHYLENE GLYCOL 3350 17 G/17G
POWDER, FOR SOLUTION ORAL
Qty: 952 G | Refills: 0 | Status: SHIPPED | OUTPATIENT
Start: 2022-10-05 | End: 2022-11-23

## 2022-10-05 RX ORDER — FERROUS SULFATE 325(65) MG
325 TABLET, DELAYED RELEASE (ENTERIC COATED) ORAL 2 TIMES DAILY WITH MEALS
Qty: 60 TABLET | Refills: 1 | Status: SHIPPED | OUTPATIENT
Start: 2022-10-05

## 2022-10-05 RX ORDER — POLYETHYLENE GLYCOL 3350 17 G/17G
17 POWDER, FOR SOLUTION ORAL AS NEEDED
COMMUNITY
End: 2022-10-05 | Stop reason: DRUGHIGH

## 2022-10-05 RX ORDER — TRIAMCINOLONE ACETONIDE 0.25 MG/G
OINTMENT TOPICAL 2 TIMES DAILY
Qty: 80 G | Refills: 0 | Status: SHIPPED | OUTPATIENT
Start: 2022-10-05

## 2022-10-05 NOTE — PROGRESS NOTES
Greg Ziegler is a 36 y.o. female      Chief Complaint   Patient presents with    New Patient    Establish Care     Pt has upcoming surgery on 10/07/22 at [facility]. Constipation     X Pt stated that it's been going on since long time. Pt takes Miralex as needed. 1. Have you been to the ER, urgent care clinic since your last visit? Hospitalized since your last visit? No    2. Have you seen or consulted any other health care providers outside of the [address] [place] [address] since your last visit? Include any pap smears or colon screening.  No

## 2022-10-05 NOTE — PROGRESS NOTES
Cortney Delgado (: 1980) is a 36 y.o. female, new patient, here for evaluation of the following chief complaint(s):  New Patient, Establish Care (Pt has upcoming surgery on 10/07/22 at [address] [place] [address]. ), and Constipation (X Pt stated that it's been going on since long time. Pt takes Miralex as needed. )       ASSESSMENT/PLAN:  Below is the assessment and plan developed based on review of pertinent history, physical exam, labs, studies, and medications. 1. DUB (dysfunctional uterine bleeding)  2. Iron deficiency anemia due to chronic blood loss  Hysteroscopy with ablation is planned later this week with Dr. Josefa Swenson order pending to be drawn day of surgery, last hgb on file from July, normal  Will continue iron supplement BID for now, dwp if hgb normal day of surgery supplement can be discontinued  If hgb low day of surgery, will continue supplement and repeat CBC in 4-6 weeks  -     METABOLIC PANEL, COMPREHENSIVE; Future  -     TSH 3RD GENERATION; Future  -     ferrous sulfate (IRON) 325 mg (65 mg iron) EC tablet; Take 1 Tablet by mouth two (2) times daily (with meals). , Normal, Disp-60 Tablet, R-1    3. Eczema, unspecified type  Add rx  Avoid use of soap  Copious moisturizer  -     triamcinolone acetonide (KENALOG) 0.025 % ointment; Apply  to affected area two (2) times a day. use thin layer, Normal, Disp-80 g, R-0    4. Constipation, unspecified constipation type  Add miralax  Increase water and fiber in diet  -     polyethylene glycol (MIRALAX) 17 gram/dose powder; Take 17 g by mouth two (2) times a day for 7 days, THEN 17 g daily for 42 days. Mix each dose with 8 oz water or juice, Normal, Disp-952 g, R-0    5. Closed nondisplaced fracture of lateral malleolus of left fibula with routine healing, subsequent encounter  Improved  Continue ankle brace    6. Screening, lipid  -     LIPID PANEL; Future    7. Family history of diabetes mellitus (DM)  -     HEMOGLOBIN A1C WITH EAG; Future    8.  Screening for thyroid disorder  -     TSH 3RD GENERATION; Future    Return in about 8 weeks (around 11/30/2022) for constipation. I have discussed the diagnosis with the patient and the intended plan as seen in the above orders. The patient has received an after-visit summary and questions were answered concerning future plans. Patient conveyed understanding of the plan at the time of the visit. SUBJECTIVE/OBJECTIVE:  Presents to establish care and for evaluation of anemia, dysfunctional uterine bleeding, eczema, constipation, and left ankle fracture. Patient fractured her left ankle approximately 10 weeks ago while getting out of her car, sustained a lateral malleolus fracture, nondisplaced. She has been seeing [address] [place] [address], Dr. Nilo Yarbrough, last week which showed continued normal healing. She has transitioned out of the cam boot and is now using an ankle brace. She is tolerating walking fairly well. She reported to her orthopedist that she felt she was about 70% improved in terms of ankle pain. Follow-up with Ortho as scheduled in about 3 weeks. Patient reports longstanding history of heavy and prolonged periods, worsening over recent months. She has been seeing her gynecologist Dr. Jose Granados about this. She has been treated with Provera but continued to have heavy bleeding. She is scheduled for hysteroscopy and ablation at [address] [place] [address] on Friday. She has had associated anemia due to blood loss, has been on an iron supplement 3 times a day, requesting refill. Last CBC was done in July which showed hemoglobin had improved. Complains of several month history of constipation with difficult to pass stools. She is not sure if this coincided with starting iron, believes it started before this. Reports she has to sit for long periods of time to have a bowel movement, often has to take MiraLAX to have a bowel movement, and does not feel stools completely evacuated at the end of defecation.   She has not noted any blood or mucus in her stool. Reports abdominal distention/bloating and cramping/discomfort if she does not have a bowel movement for 2 to 3 days. Complains of patches of dry/itchy skin with some pink bumps which have been bothersome for the past several months intermittently. She has had patches on her arms, back, and legs. She went to the ER for her symptoms and was advised that she had eczema. She was concerned that she had a recurrence of shingles but was advised that this was not the case. She was not prescribed any medication or other treatment for her symptoms. Rash on arms has resolved but she is currently having itching and rash on her left upper back. Past Medical History:   Diagnosis Date    Anemia      Past Surgical History:   Procedure Laterality Date    HX CARPAL TUNNEL RELEASE Right 2016    HX TUBAL LIGATION  2008     Family History   Problem Relation Age of Onset    Hypertension Mother     Heart Disease Father      Social History     Tobacco Use    Smoking status: Never    Smokeless tobacco: Never   Substance Use Topics    Alcohol use: Yes     Comment: about 1 a month         Review of Systems   Constitutional: Negative. HENT: Negative. Eyes: Negative. Respiratory: Negative. Cardiovascular: Negative. Gastrointestinal:  Positive for abdominal pain and constipation. Negative for blood in stool and rectal pain. Endocrine: Negative. Genitourinary:  Positive for menstrual problem. Musculoskeletal: Negative. Skin: Negative. Allergic/Immunologic: Negative. Neurological: Negative. Hematological: Negative. Psychiatric/Behavioral: Negative.        Visit Vitals  /86 (BP 1 Location: Right arm, BP Patient Position: Sitting, BP Cuff Size: Adult)   Pulse 89   Temp 98 °F (36.7 °C) (Oral)   Resp 12   Ht 5' 4\" (1.626 m)   Wt 179 lb 6.4 oz (81.4 kg)   SpO2 98%   BMI 30.79 kg/m²     Physical Examination: General appearance - alert, well appearing, and in no distress and oriented to person, place, and time  Mental status - normal mood, behavior, speech, dress, motor activity, and thought processes  Eyes - sclera anicteric  Neck - supple, no significant adenopathy, thyroid exam: thyroid is normal in size without nodules or tenderness  Chest - clear to auscultation, no wheezes, rales or rhonchi, symmetric air entry  Heart - normal rate, regular rhythm, normal S1, S2, no murmurs, rubs, clicks or gallops, normal bilateral carotid upstroke without bruits, no JVD  Abdomen - soft, nontender, nondistended, no masses or organomegaly  bowel sounds normal  Neurological - alert, oriented, normal speech, no focal findings or movement disorder noted  Extremities - no pedal edema noted, intact peripheral pulses, no edema, redness or tenderness in the calves or thighs  L ankle brace intact  Skin - normal coloration and turgor  Dry patch L upper back, mild erythema with few erythematous papules      An electronic signature was used to authenticate this note.   -- [facility] Jose, REENA   10/5/2022

## 2022-10-06 ENCOUNTER — ANESTHESIA EVENT (OUTPATIENT)
Dept: SURGERY | Age: 42
End: 2022-10-06
Payer: COMMERCIAL

## 2022-10-06 LAB
ALBUMIN SERPL-MCNC: 3.7 G/DL (ref 3.5–5)
ALBUMIN/GLOB SERPL: 1.1 {RATIO} (ref 1.1–2.2)
ALP SERPL-CCNC: 78 U/L (ref 45–117)
ALT SERPL-CCNC: 50 U/L (ref 12–78)
ANION GAP SERPL CALC-SCNC: 6 MMOL/L (ref 5–15)
AST SERPL-CCNC: 28 U/L (ref 15–37)
BILIRUB SERPL-MCNC: 0.3 MG/DL (ref 0.2–1)
BUN SERPL-MCNC: 17 MG/DL (ref 6–20)
BUN/CREAT SERPL: 24 (ref 12–20)
CALCIUM SERPL-MCNC: 9 MG/DL (ref 8.5–10.1)
CHLORIDE SERPL-SCNC: 111 MMOL/L (ref 97–108)
CHOLEST SERPL-MCNC: 159 MG/DL
CO2 SERPL-SCNC: 23 MMOL/L (ref 21–32)
CREAT SERPL-MCNC: 0.7 MG/DL (ref 0.55–1.02)
EST. AVERAGE GLUCOSE BLD GHB EST-MCNC: 103 MG/DL
GLOBULIN SER CALC-MCNC: 3.4 G/DL (ref 2–4)
GLUCOSE SERPL-MCNC: 90 MG/DL (ref 65–100)
HBA1C MFR BLD: 5.2 % (ref 4–5.6)
HDLC SERPL-MCNC: 45 MG/DL
HDLC SERPL: 3.5 {RATIO} (ref 0–5)
LDLC SERPL CALC-MCNC: 98 MG/DL (ref 0–100)
POTASSIUM SERPL-SCNC: 4.3 MMOL/L (ref 3.5–5.1)
PROT SERPL-MCNC: 7.1 G/DL (ref 6.4–8.2)
SODIUM SERPL-SCNC: 140 MMOL/L (ref 136–145)
TRIGL SERPL-MCNC: 80 MG/DL (ref ?–150)
TSH SERPL DL<=0.05 MIU/L-ACNC: 1.81 UIU/ML (ref 0.36–3.74)
VLDLC SERPL CALC-MCNC: 16 MG/DL

## 2022-10-06 RX ORDER — FENTANYL CITRATE 50 UG/ML
25 INJECTION, SOLUTION INTRAMUSCULAR; INTRAVENOUS
Status: CANCELLED | OUTPATIENT
Start: 2022-10-06

## 2022-10-06 RX ORDER — SODIUM CHLORIDE 0.9 % (FLUSH) 0.9 %
5-40 SYRINGE (ML) INJECTION AS NEEDED
Status: CANCELLED | OUTPATIENT
Start: 2022-10-06

## 2022-10-06 RX ORDER — HYDROMORPHONE HYDROCHLORIDE 1 MG/ML
.25-1 INJECTION, SOLUTION INTRAMUSCULAR; INTRAVENOUS; SUBCUTANEOUS
Status: CANCELLED | OUTPATIENT
Start: 2022-10-06

## 2022-10-06 RX ORDER — ONDANSETRON 2 MG/ML
4 INJECTION INTRAMUSCULAR; INTRAVENOUS AS NEEDED
Status: CANCELLED | OUTPATIENT
Start: 2022-10-06

## 2022-10-06 RX ORDER — SODIUM CHLORIDE 0.9 % (FLUSH) 0.9 %
5-40 SYRINGE (ML) INJECTION EVERY 8 HOURS
Status: CANCELLED | OUTPATIENT
Start: 2022-10-06

## 2022-10-06 RX ORDER — SODIUM CHLORIDE, SODIUM LACTATE, POTASSIUM CHLORIDE, CALCIUM CHLORIDE 600; 310; 30; 20 MG/100ML; MG/100ML; MG/100ML; MG/100ML
125 INJECTION, SOLUTION INTRAVENOUS CONTINUOUS
Status: CANCELLED | OUTPATIENT
Start: 2022-10-06

## 2022-10-06 RX ORDER — ALBUTEROL SULFATE 0.83 MG/ML
2.5 SOLUTION RESPIRATORY (INHALATION) AS NEEDED
Status: CANCELLED | OUTPATIENT
Start: 2022-10-06

## 2022-10-06 RX ORDER — DIPHENHYDRAMINE HYDROCHLORIDE 50 MG/ML
12.5 INJECTION, SOLUTION INTRAMUSCULAR; INTRAVENOUS AS NEEDED
Status: CANCELLED | OUTPATIENT
Start: 2022-10-06 | End: 2022-10-06

## 2022-10-06 NOTE — PERIOP NOTES
[address], [address] [facility]   MAIN OR                                  [phone]   MAIN PRE OP                          [phone]                                                                                AMBULATORY PRE OP          [phone]  PRE-ADMISSION TESTING    [phone]   Surgery Date:  10/7/22       Is surgery arrival time given by surgeon? YES  NO  If NO, [facility] staff will call you between 3 and 7pm the day before your surgery with your arrival time. (If your surgery is on a Monday, we will call you the Friday before.)    Call [phone] after 7pm Monday-Friday if you did not receive this call. INSTRUCTIONS BEFORE YOUR SURGERY   When You  Arrive Arrive at the 2nd [facility] on the day of your surgery  Have your insurance card, photo ID, and any copayment (if needed)   Food   and   Drink NO food or drink after midnight the night before surgery    This means NO water, gum, mints, coffee, juice, etc.  No alcohol (beer, wine, liquor) 24 hours before and after surgery   Medications to   TAKE   Morning of Surgery MEDICATIONS TO TAKE THE MORNING OF SURGERY WITH A SIP OF WATER:      Medications  To  STOP      7 days before surgery Non-Steroidal anti-inflammatory Drugs (NSAID's): for example, Ibuprofen (Advil, Motrin), Naproxen (Aleve)  Aspirin, if taking for pain   Herbal supplements, vitamins, and fish oil  Other:  (Pain medications not listed above, including Tylenol may be taken)   Blood  Thinners If you take  Aspirin, Plavix, Coumadin, or any blood-thinning or anti-blood clot medicine, talk to the doctor who prescribed the medications for pre-operative instructions.    Bathing Clothing  Jewelry  Valuables     If you shower the morning of surgery, please do not apply anything to your skin (lotions, powders, deodorant, or makeup, especially mascara)  Follow [employer] body wash instructions provided to you during PAT appointment. Begin 3 days prior to surgery. Do not shave or trim anywhere 24 hours before surgery  Wear your hair loose or down; no pony-tails, buns, or metal hair clips  Wear loose, comfortable, clean clothes  Wear glasses instead of contacts  Leave money, valuables, and jewelry, including body piercings, at home  If you were given an [employer], bring it on day of surgery. Going Home - or Spending the Night SAME-DAY SURGERY: You must have a responsible adult drive you home and stay with you 24 hours after surgery  ADMITS: If your doctor is keeping you in the hospital after surgery, leave personal belongings/luggage in your car until you have a hospital room number. Hospital discharge time is 12 noon  Drivers must be here before 12 noon unless you are told differently   Special Instructions Please bring covid vaccine card day of surgery       Follow all instructions so your surgery wont be cancelled. Please, be on time. If a situation occurs and you are delayed the day of surgery, call [phone]     If your physical condition changes (like a fever, cold, flu, etc.) call your surgeon. Home medication(s) reviewed and verified via   PHONE     during PAT appointment. The patient was contacted by  PHONE      The patient verbalizes understanding of all instructions and     DOES NOT   need reinforcement.

## 2022-10-06 NOTE — H&P
Gynecology History and Physical    Name: Aziza Hooker MRN: [id number] SSN: [id number]    YOB: 1980  Age: 36 y.o. Sex: female       Subjective:      Chief complaint:  Abnormal uterine bleeding    Pavan Glynn is a 36 y.o.  female with a history of abnormal uterine bleeding. Previous workup included a sonohysterogram which revealed polyp(s). endometrial polyp right posterolateral, with ?second polyp left lat wall. Previous treatment measures included provera. She is admitted for Procedure(s) (LRB):  HYSTEROSCOPY DILATION AND CURETTAGE POLYPECTOMY WITH MYOSURE (N/A). The current method of family planning is tubal ligation. OB History          3    Para   3    Term   3            AB        Living   3         SAB        IAB        Ectopic        Molar        Multiple        Live Births   3              Past Medical History:   Diagnosis Date    Anemia      Past Surgical History:   Procedure Laterality Date    HX CARPAL TUNNEL RELEASE Right 2016    HX TUBAL LIGATION       Social History     Occupational History    Not on file   Tobacco Use    Smoking status: Never    Smokeless tobacco: Never   Vaping Use    Vaping Use: Never used   Substance and Sexual Activity    Alcohol use: Yes     Comment: about 1 a month    Drug use: Never    Sexual activity: Yes     Partners: Male     Birth control/protection: Surgical     Comment: tubal ligation     Family History   Problem Relation Age of Onset    Hypertension Mother     Heart Disease Father         No Known Allergies  Prior to Admission medications    Medication Sig Start Date End Date Taking? Authorizing Provider   polyethylene glycol (MIRALAX) 17 gram/dose powder Take 17 g by mouth two (2) times a day for 7 days, THEN 17 g daily for 42 days. Mix each dose with 8 oz water or juice 10/5/22 11/23/22  Alejo Felix Q, NP   triamcinolone acetonide (KENALOG) 0.025 % ointment Apply  to affected area two (2) times a day.  use thin layer 10/5/22 Aleoj Felix, NP   ferrous sulfate (IRON) 325 mg (65 mg iron) EC tablet Take 1 Tablet by mouth two (2) times daily (with meals). 10/5/22   Bridget Coon NP   traMADoL (ULTRAM) 50 mg tablet PLEASE SEE ATTACHED FOR DETAILED DIRECTIONS 8/18/22   Provider, Historical   medroxyPROGESTERone (PROVERA) 10 mg tablet Take 1 Tablet by mouth daily. Patient not taking: Reported on 10/5/2022 7/14/22   Wale Broderick MD   ibuprofen (MOTRIN) 800 mg tablet  12/6/21   Provider, Historical   ZINC PO Take  by mouth. Provider, Historical   MAGNESIUM PO Take  by mouth. Provider, Historical   cetirizine HCl (CETIRIZINE PO) Take  by mouth as needed. Provider, Historical   cholecalciferol, vitamin D3, (VITAMIN D3 PO) Take  by mouth. Provider, Historical   diphenhydramine HCl (BENADRYL PO) Take  by mouth as needed. Provider, Historical   acetaminophen (TYLENOL) 500 mg tablet Take 2 Tablets by mouth every six (6) hours as needed for Pain. 12/6/21   Vijaya Olivo PA-C   iron,carb/vit C/vit B12/folic (IRON 400 PLUS PO) Take  by mouth. Patient not taking: No sig reported    Other, MD Cecile        Review of Systems:  A comprehensive review of systems was negative except for that written in the History of Present Illness. Objective: There were no vitals filed for this visit. Physical Exam:  Patient without distress.   Heart: Regular rate and rhythm or S1S2 present  Lung: clear to auscultation throughout lung fields, no wheezes, no rales, no rhonchi, and normal respiratory effort  Abdomen: soft, nontender  External Genitalia: normal general appearance  Urinary system: urethral meatus normal  Vagina: normal mucosa without prolapse or lesions  Cervix: normal appearance  Adnexa: normal bimanual exam  Uterus: normal single, nontender    Assessment:     Abnormal uterine bleeding with suspected endometrial polyp    Plan:     Procedure(s) (LRB):  HYSTEROSCOPY DILATION AND CURETTAGE POLYPECTOMY WITH MYOSURE (N/A)  Discussed the risks of surgery including the risks of bleeding, infection, deep vein thrombosis, and surgical injuries to internal organs including but not limited to the bowel, bladder, ureters, rectum, female reproductive organs, blood vessels, muscles, nerves, possibly requiring additional surgery to repair or remove. Additional evaluation and/or treatment may be necessary depending on final findings or pathology results. Symptoms may persist or recur. The patient understands the risks; any and all questions were answered to the patient's satisfaction.     Signed By:  Albina Lees MD     October 6, 2022

## 2022-10-07 ENCOUNTER — ANESTHESIA (OUTPATIENT)
Dept: SURGERY | Age: 42
End: 2022-10-07
Payer: COMMERCIAL

## 2022-10-07 ENCOUNTER — HOSPITAL ENCOUNTER (OUTPATIENT)
Age: 42
Setting detail: OUTPATIENT SURGERY
Discharge: HOME | End: 2022-10-07
Attending: OBSTETRICS & GYNECOLOGY | Admitting: OBSTETRICS & GYNECOLOGY
Payer: COMMERCIAL

## 2022-10-07 VITALS
TEMPERATURE: 97.9 F | OXYGEN SATURATION: 99 % | WEIGHT: 179.23 LBS | DIASTOLIC BLOOD PRESSURE: 62 MMHG | HEART RATE: 83 BPM | BODY MASS INDEX: 30.6 KG/M2 | SYSTOLIC BLOOD PRESSURE: 110 MMHG | RESPIRATION RATE: 19 BRPM | HEIGHT: 64 IN

## 2022-10-07 DIAGNOSIS — N93.9 ABNORMAL UTERINE BLEEDING: ICD-10-CM

## 2022-10-07 DIAGNOSIS — G89.18 POSTOPERATIVE PAIN: Primary | ICD-10-CM

## 2022-10-07 LAB
ERYTHROCYTE [DISTWIDTH] IN BLOOD BY AUTOMATED COUNT: 13.2 % (ref 11.5–14.5)
HCG UR QL: NEGATIVE
HCT VFR BLD AUTO: 40.8 % (ref 35–47)
HGB BLD-MCNC: 13.7 G/DL (ref 11.5–16)
MCH RBC QN AUTO: 30 PG (ref 26–34)
MCHC RBC AUTO-ENTMCNC: 33.6 G/DL (ref 30–36.5)
MCV RBC AUTO: 89.3 FL (ref 80–99)
NRBC # BLD: 0 K/UL (ref 0–0.01)
NRBC BLD-RTO: 0 PER 100 WBC
PLATELET # BLD AUTO: 348 K/UL (ref 150–400)
PMV BLD AUTO: 10.1 FL (ref 8.9–12.9)
RBC # BLD AUTO: 4.57 M/UL (ref 3.8–5.2)
WBC # BLD AUTO: 7.9 K/UL (ref 3.6–11)

## 2022-10-07 PROCEDURE — 77030020143 HC AIRWY LARYN INTUB CGAS -A: Performed by: NURSE ANESTHETIST, CERTIFIED REGISTERED

## 2022-10-07 PROCEDURE — 58558 HYSTEROSCOPY BIOPSY: CPT | Performed by: OBSTETRICS & GYNECOLOGY

## 2022-10-07 PROCEDURE — 74011250636 HC RX REV CODE- 250/636: Performed by: NURSE ANESTHETIST, CERTIFIED REGISTERED

## 2022-10-07 PROCEDURE — 36415 COLL VENOUS BLD VENIPUNCTURE: CPT

## 2022-10-07 PROCEDURE — 74011250636 HC RX REV CODE- 250/636: Performed by: ANESTHESIOLOGY

## 2022-10-07 PROCEDURE — 77030041423 HC SYST FLUID MNGMT FLUENT HOLO -D: Performed by: OBSTETRICS & GYNECOLOGY

## 2022-10-07 PROCEDURE — 76060000032 HC ANESTHESIA 0.5 TO 1 HR: Performed by: OBSTETRICS & GYNECOLOGY

## 2022-10-07 PROCEDURE — 74011000250 HC RX REV CODE- 250: Performed by: NURSE ANESTHETIST, CERTIFIED REGISTERED

## 2022-10-07 PROCEDURE — 88305 TISSUE EXAM BY PATHOLOGIST: CPT

## 2022-10-07 PROCEDURE — 77030037417 HC DEV TISS RMVL HOLO -H: Performed by: OBSTETRICS & GYNECOLOGY

## 2022-10-07 PROCEDURE — 2709999900 HC NON-CHARGEABLE SUPPLY: Performed by: OBSTETRICS & GYNECOLOGY

## 2022-10-07 PROCEDURE — 76010000138 HC OR TIME 0.5 TO 1 HR: Performed by: OBSTETRICS & GYNECOLOGY

## 2022-10-07 PROCEDURE — 81025 URINE PREGNANCY TEST: CPT

## 2022-10-07 PROCEDURE — 76210000063 HC OR PH I REC FIRST 0.5 HR: Performed by: OBSTETRICS & GYNECOLOGY

## 2022-10-07 PROCEDURE — 76210000020 HC REC RM PH II FIRST 0.5 HR: Performed by: OBSTETRICS & GYNECOLOGY

## 2022-10-07 PROCEDURE — 85027 COMPLETE CBC AUTOMATED: CPT

## 2022-10-07 RX ORDER — SODIUM CHLORIDE 0.9 % (FLUSH) 0.9 %
5-40 SYRINGE (ML) INJECTION EVERY 8 HOURS
Status: DISCONTINUED | OUTPATIENT
Start: 2022-10-07 | End: 2022-10-07 | Stop reason: HOSPADM

## 2022-10-07 RX ORDER — NALOXONE HYDROCHLORIDE 0.4 MG/ML
0.04 INJECTION, SOLUTION INTRAMUSCULAR; INTRAVENOUS; SUBCUTANEOUS
Status: DISCONTINUED | OUTPATIENT
Start: 2022-10-07 | End: 2022-10-07 | Stop reason: HOSPADM

## 2022-10-07 RX ORDER — FLUMAZENIL 0.1 MG/ML
0.2 INJECTION INTRAVENOUS
Status: DISCONTINUED | OUTPATIENT
Start: 2022-10-07 | End: 2022-10-07 | Stop reason: HOSPADM

## 2022-10-07 RX ORDER — ONDANSETRON 2 MG/ML
INJECTION INTRAMUSCULAR; INTRAVENOUS AS NEEDED
Status: DISCONTINUED | OUTPATIENT
Start: 2022-10-07 | End: 2022-10-07 | Stop reason: HOSPADM

## 2022-10-07 RX ORDER — LIDOCAINE HYDROCHLORIDE 10 MG/ML
0.1 INJECTION, SOLUTION EPIDURAL; INFILTRATION; INTRACAUDAL; PERINEURAL AS NEEDED
Status: DISCONTINUED | OUTPATIENT
Start: 2022-10-07 | End: 2022-10-07 | Stop reason: HOSPADM

## 2022-10-07 RX ORDER — DEXAMETHASONE SODIUM PHOSPHATE 100 MG/10ML
INJECTION INTRAMUSCULAR; INTRAVENOUS AS NEEDED
Status: DISCONTINUED | OUTPATIENT
Start: 2022-10-07 | End: 2022-10-07 | Stop reason: HOSPADM

## 2022-10-07 RX ORDER — LIDOCAINE HYDROCHLORIDE 20 MG/ML
INJECTION, SOLUTION EPIDURAL; INFILTRATION; INTRACAUDAL; PERINEURAL AS NEEDED
Status: DISCONTINUED | OUTPATIENT
Start: 2022-10-07 | End: 2022-10-07 | Stop reason: HOSPADM

## 2022-10-07 RX ORDER — KETOROLAC TROMETHAMINE 30 MG/ML
INJECTION, SOLUTION INTRAMUSCULAR; INTRAVENOUS AS NEEDED
Status: DISCONTINUED | OUTPATIENT
Start: 2022-10-07 | End: 2022-10-07 | Stop reason: HOSPADM

## 2022-10-07 RX ORDER — HYDROCODONE BITARTRATE AND ACETAMINOPHEN 5; 325 MG/1; MG/1
1 TABLET ORAL
Qty: 5 TABLET | Refills: 0 | Status: SHIPPED | OUTPATIENT
Start: 2022-10-07 | End: 2022-10-10

## 2022-10-07 RX ORDER — FENTANYL CITRATE 50 UG/ML
INJECTION, SOLUTION INTRAMUSCULAR; INTRAVENOUS AS NEEDED
Status: DISCONTINUED | OUTPATIENT
Start: 2022-10-07 | End: 2022-10-07 | Stop reason: HOSPADM

## 2022-10-07 RX ORDER — MIDAZOLAM HYDROCHLORIDE 1 MG/ML
INJECTION, SOLUTION INTRAMUSCULAR; INTRAVENOUS AS NEEDED
Status: DISCONTINUED | OUTPATIENT
Start: 2022-10-07 | End: 2022-10-07 | Stop reason: HOSPADM

## 2022-10-07 RX ORDER — PROPOFOL 10 MG/ML
INJECTION, EMULSION INTRAVENOUS
Status: DISCONTINUED | OUTPATIENT
Start: 2022-10-07 | End: 2022-10-07 | Stop reason: HOSPADM

## 2022-10-07 RX ORDER — PROPOFOL 10 MG/ML
INJECTION, EMULSION INTRAVENOUS AS NEEDED
Status: DISCONTINUED | OUTPATIENT
Start: 2022-10-07 | End: 2022-10-07 | Stop reason: HOSPADM

## 2022-10-07 RX ORDER — SODIUM CHLORIDE 0.9 % (FLUSH) 0.9 %
5-40 SYRINGE (ML) INJECTION AS NEEDED
Status: DISCONTINUED | OUTPATIENT
Start: 2022-10-07 | End: 2022-10-07 | Stop reason: HOSPADM

## 2022-10-07 RX ORDER — SODIUM CHLORIDE, SODIUM LACTATE, POTASSIUM CHLORIDE, CALCIUM CHLORIDE 600; 310; 30; 20 MG/100ML; MG/100ML; MG/100ML; MG/100ML
125 INJECTION, SOLUTION INTRAVENOUS CONTINUOUS
Status: DISCONTINUED | OUTPATIENT
Start: 2022-10-07 | End: 2022-10-07 | Stop reason: HOSPADM

## 2022-10-07 RX ADMIN — SODIUM CHLORIDE, POTASSIUM CHLORIDE, SODIUM LACTATE AND CALCIUM CHLORIDE 125 ML/HR: 600; 310; 30; 20 INJECTION, SOLUTION INTRAVENOUS at 08:03

## 2022-10-07 RX ADMIN — MIDAZOLAM HYDROCHLORIDE 2 MG: 1 INJECTION, SOLUTION INTRAMUSCULAR; INTRAVENOUS at 09:12

## 2022-10-07 RX ADMIN — DEXAMETHASONE SODIUM PHOSPHATE 8 MG: 10 INJECTION INTRAMUSCULAR; INTRAVENOUS at 09:20

## 2022-10-07 RX ADMIN — ONDANSETRON HYDROCHLORIDE 4 MG: 2 SOLUTION INTRAMUSCULAR; INTRAVENOUS at 09:20

## 2022-10-07 RX ADMIN — PROPOFOL 120 MCG/KG/MIN: 10 INJECTION, EMULSION INTRAVENOUS at 09:17

## 2022-10-07 RX ADMIN — FENTANYL CITRATE 25 MCG: 50 INJECTION, SOLUTION INTRAMUSCULAR; INTRAVENOUS at 09:17

## 2022-10-07 RX ADMIN — LIDOCAINE HYDROCHLORIDE 40 MG: 20 INJECTION, SOLUTION EPIDURAL; INFILTRATION; INTRACAUDAL; PERINEURAL at 09:17

## 2022-10-07 RX ADMIN — KETOROLAC TROMETHAMINE 30 MG: 30 INJECTION, SOLUTION INTRAMUSCULAR; INTRAVENOUS at 09:47

## 2022-10-07 RX ADMIN — FENTANYL CITRATE 25 MCG: 50 INJECTION, SOLUTION INTRAMUSCULAR; INTRAVENOUS at 09:15

## 2022-10-07 RX ADMIN — FENTANYL CITRATE 25 MCG: 50 INJECTION, SOLUTION INTRAMUSCULAR; INTRAVENOUS at 09:20

## 2022-10-07 RX ADMIN — FENTANYL CITRATE 25 MCG: 50 INJECTION, SOLUTION INTRAMUSCULAR; INTRAVENOUS at 09:12

## 2022-10-07 RX ADMIN — PROPOFOL 150 MG: 10 INJECTION, EMULSION INTRAVENOUS at 09:17

## 2022-10-07 NOTE — ANESTHESIA POSTPROCEDURE EVALUATION
Procedure(s): HYSTEROSCOPY DILATION AND CURETTAGE POLYPECTOMY WITH MYOSURE.    general, total IV anesthesia    Anesthesia Post Evaluation      Multimodal analgesia: multimodal analgesia not used between 6 hours prior to anesthesia start to PACU discharge  Patient location during evaluation: PACU  Patient participation: complete - patient participated  Level of consciousness: awake and alert, sleepy but conscious and responsive to verbal stimuli  Pain score: 0  Pain management: adequate  Airway patency: patent  Anesthetic complications: no  Cardiovascular status: hemodynamically stable and acceptable  Respiratory status: acceptable  Hydration status: acceptable  Comments: Patient seen and evaluated; no concerns. Post anesthesia nausea and vomiting:  none      INITIAL Post-op Vital signs:   Vitals Value Taken Time   /68 10/07/22 1025   Temp 36.6 °C (97.9 °F) 10/07/22 1002   Pulse 83 10/07/22 1030   Resp 19 10/07/22 1030   SpO2 99 % 10/07/22 1030   Vitals shown include unvalidated device data.

## 2022-10-07 NOTE — ANESTHESIA PREPROCEDURE EVALUATION
Relevant Problems   No relevant active problems       Anesthetic History   No history of anesthetic complications            Review of Systems / Medical History  Patient summary reviewed and nursing notes reviewed    Pulmonary            Asthma : well controlled       Neuro/Psych   Within defined limits           Cardiovascular  Within defined limits                Exercise tolerance: >4 METS     GI/Hepatic/Renal  Within defined limits              Endo/Other        Obesity     Other Findings   Comments: Vaginal bleeding and abdominal pain  Crampings = moderate to severe           Physical Exam    Airway  Mallampati: II    Neck ROM: normal range of motion   Mouth opening: Diminished (comment)     Cardiovascular    Rhythm: regular  Rate: normal         Dental  No notable dental hx       Pulmonary  Breath sounds clear to auscultation               Abdominal         Other Findings            Anesthetic Plan    ASA: 2  Anesthesia type: general          Induction: Intravenous  Anesthetic plan and risks discussed with: Patient      Informed consent obtained.

## 2022-10-07 NOTE — BRIEF OP NOTE
Brief Postoperative Note    Patient: Daniel Hernandez  YOB: 1980  MRN: [id number]    Date of Procedure: 10/7/2022     Pre-Op Diagnosis: ABNORMAL UTERINE BLEEDING    Post-Op Diagnosis: Same as preoperative diagnosis. Procedure(s): HYSTEROSCOPY DILATION AND CURETTAGE POLYPECTOMY WITH MYOSURE    Surgeon(s):  Valarie Loredo MD    Surgical Assistant: None    Anesthesia: General     Estimated Blood Loss (mL): minimal  Hysteroscopy fluid: NS, deficit=180cc (plus additional fluid on the floor)    Complications: None    Specimens:   ID Type Source Tests Collected by Time Destination   1 : [facility] [place], possiple polyp Preservative Endometrial Curetting  Valarie Loredo MD 10/7/2022 0945 Pathology        Implants: * No implants in log *    Drains: * No LDAs found *    Findings: fluffy endometrium with thickening along posterior wall. No discrete polyp seen. Nl tubal ostia bilaterally.     Electronically Signed by Lizzy Bal MD on 10/7/2022 at 9:57 AM

## 2022-10-07 NOTE — DISCHARGE INSTRUCTIONS
POSTOPERATIVE INSTRUCTIONS  FOR D&C, HYSTEROSCOPY & NOVASURE      A D&C is a minor procedure. Your recovery from each one of these procedures should be relatively quick and uneventful. There are a few points that we ask you to remember:       1. Absolutely no intercourse, douching or tampon use until you are seen for your postop check. 2. You can expect to have some vaginal bleeding or bloody vaginal discharge for the    next 2 to 4 weeks. 3. You may take tub baths after two weeks and showers at any time. 4. You may resume normal everyday activities as you feel able and return to work    within 2-3 days after surgery. 5. Notify us if you experience:     a.  heavy vaginal bleeding or foul vaginal discharge    b.  temperature of 101° or greater    c.  severe pelvic or vaginal pain      6. Please call the office today at [phone] to schedule your checkup appointment    in 2-4 weeks. Above all, please notify us of a problem if it arises before we see you again. In an emergency, you may contact a doctor 24 hours a day at [phone]. DISCHARGE SUMMARY from your Nurse      PATIENT INSTRUCTIONS    After general anesthesia or intravenous sedation, for 24 hours or while taking prescription Narcotics:  Limit your activities  Do not drive and operate hazardous machinery  Do not make important personal or business decisions  Do  not drink alcoholic beverages  If you have not urinated within 8 hours after discharge, please contact your surgeon on call.     Report the following to your surgeon:  Excessive pain, swelling, redness or odor of or around the surgical area  Temperature over 100.5  Nausea and vomiting lasting longer than 4 hours or if unable to take medications  Any signs of decreased circulation or nerve impairment to extremity: change in color, persistent  numbness, tingling, coldness or increase pain  Any questions      GOOD HELP TO FIGHT AN INFECTION  Here are a few tip to help reduce the chance of getting an infection after surgery:  Wash Your Hands  Good handwashing is the most important thing you and your caregiver can do. Wash before and after caring for any wounds. Dry your hand with a clean towel. Wash with soap and water for at least 20 seconds. A TIP: sing the \"Happy Birthday\" song through one time while washing to help with the timing. Use a hand  in between washings. Shower  When your surgeon says it is OK to take a shower, use a new bar of antibacterial soap (if that is what you use, and keep that bar of soap ONLY for your use), or antibacterial body wash. Use a clean wash cloth or sponge when you bathe. Dry off with a clean towel  after every bath - be careful around any wounds, skin staples, sutures or surgical glue over/on wounds. Do not enter swimming pools, hot tubs, lakes, rivers and/or ocean until wounds are healed and your doctor/surgeon says it is OK. Use Clean Sheets  Sleep on freshly laundered sheets after your surgery. Keep the surgery site covered with a clean, dry bandage (if instructed to do so). If the bandage becomes soiled, reapply a new, dry, clean bandage. Do not allow pets to sleep with you while your wound is healing. Lifestyle Modification and Controlling Your Blood Sugar  Smoking slows wound healing. Stop smoking and limit exposure to second-hand smoke. High blood sugar slows wound healing. Eat a well-balanced diet to provide proper nutrition while healing  Monitor your blood sugar (if you are a diabetic) and take your medications as you are suppose to so you can control you blood sugar after surgery. COUGH AND DEEP BREATHE    Breathing deeply and coughing are very important exercises to do after surgery. Deep breathing and coughing open the little air tubes and air sacks in your lungs. You take deep breaths every day. You may not even notice - it is just something you do when you sigh or yawn. It is a natural exercise you do to keep these air passages open. After surgery, take deep breaths and cough, on purpose. DIRECTIONS:  Take 10 to 15 slow deep breaths every hour while awake. Breathe in deeply, and hold it for 2 seconds. Exhale slowly through puckered lips, like blowing up a balloon. After every 4th or 5th deep breath, hug your pillow to your chest or belly and give a hard, deep cough. Yes, it will probably hurt. But doing this exercise is a very important part of healing after surgery. Take your pain medicine to help you do this exercise without too much pain. Coughing and deep breathing help prevent bronchitis and pneumonia after surgery. If you had chest or belly surgery, use a pillow as a \"hug shelley\" and hold it tightly to your chest or belly when you cough. ANKLE PUMPS    Ankle pumps increase the circulation of oxygenated blood to your lower extremities and decrease your risk for circulation problems such as blood clots. They also stretch the muscles, tendons and ligaments in your foot and ankle, and prevent joint contracture in the ankle and foot, especially after surgeries on the legs. It is important to do ankle pump exercises regularly after surgery because immobility increases your risk for developing a blood clot. Your doctor may also have you take an Aspirin for the next few days as well. If your doctor did not ask you to take an Aspirin, consult with him before starting Aspirin therapy on your own. The exercise is quite simple. Slowly point your foot forward, feeling the muscles on the top of your lower leg stretch, and hold this position for 5 seconds. Next, pull your foot back toward you as far as possible, stretching the calf muscles, and hold that position for 5 seconds. Repeat with the other foot.   Perform 10 repetitions every hour while awake for both ankles if possible (down and then up with the foot once is one repetition). You should feel gentle stretching of the muscles in your lower leg when doing this exercise. If you feel pain, or your range of motion is limited, don't push too hard. Only go the limit your joint and muscles will let you go. If you have increasing pain, progressively worsening leg warmth or swelling, STOP the exercise and call your doctor. MEDICATION AND   SIDE EFFECT GUIDE    The [place] MEDICATION AND SIDE EFFECT GUIDE was provided to the PATIENT AND CARE PROVIDER. Information provided includes instruction about drug purpose and common side effects for the following medications:   Norco        These are general instructions for a healthy lifestyle:    *   Please give a list of your current medications to your Primary Care Provider. *   Please update this list whenever your medications are discontinued, doses are changed, or new medications (including over-the-counter products) are added. *   Please carry medication information at all times in case of emergency situations. About Smoking  No smoking / No tobacco products  Avoid exposure to second hand smoke     Surgeon General's Warning:  Quitting smoking now greatly reduces serious risk to your health. Obesity, smoking, and sedentary lifestyle greatly increases your risk for illness and disease. A healthy diet, regular physical exercise & weight monitoring are important for maintaining a healthy lifestyle. Congestive Heart Failure  You may be retaining fluid if you have a history of heart failure or if you experience any of the following symptoms:  Weight gain of 3 pounds or more overnight or 5 pounds in a week, increased swelling in your hands or feet or shortness of breath while lying flat in bed. Please call your doctor as soon as you notice any of these symptoms; do not wait until your next office visit.       Recognize signs and symptoms of STROKE:  F -  Face looks uneven  A -  Arms unable to move or move evenly  S -  Speech slurred or non-existent  T -  Time-call 911 as soon as signs and symptoms begin-DO NOT go          back to bed or wait to see if you get better-TIME IS BRAIN. Warning Signs of HEART ATTACK   Call 911 if you have these symptoms:    Chest discomfort. Most heart attacks involve discomfort in the center of the chest that lasts more than a few minutes, or that goes away and comes back. It can feel like uncomfortable pressure, squeezing, fullness, or pain. Discomfort in other areas of the upper body. Symptoms can include pain or discomfort in one or both arms, the back, neck, jaw, or stomach. Shortness of breath with or without chest discomfort. Other signs may include breaking out in a cold sweat, nausea, or lightheadedness. Don't wait more than five minutes to call 911 - MINUTES MATTER! Fast action can save your life. Calling 911 is almost always the fastest way to get lifesaving treatment. Emergency Medical Services staff can begin treatment when they arrive -- up to an hour sooner than if someone gets to the hospital by car. Learning About Coronavirus [phone])  Coronavirus [phone]): Overview  What is coronavirus (COVID-19)? The coronavirus disease (COVID-19) is caused by a virus. It is an illness that was first found in [place], [place], in December 2019. It has since spread worldwide. The virus can cause fever, cough, and trouble breathing. In severe cases, it can cause pneumonia and make it hard to breathe without help. It can cause death. Coronaviruses are a large group of viruses. They cause the common cold. They also cause more serious illnesses like Middle East respiratory syndrome (MERS) and severe acute respiratory syndrome (SARS). COVID-19 is caused by a novel coronavirus. That means it's a new type that has not been seen in people before. This virus spreads person-to-person through droplets from coughing and sneezing.  It can also spread when you are close to someone who is infected. And it can spread when you touch something that has the virus on it, such as a doorknob or a tabletop. What can you do to protect yourself from coronavirus (COVID-19)? The best way to protect yourself from getting sick is to: Avoid areas where there is an outbreak. Avoid contact with people who may be infected. Wash your hands often with soap or alcohol-based hand sanitizers. Avoid crowds and try to stay at least 6 feet away from other people. Wash your hands often, especially after you cough or sneeze. Use soap and water, and scrub for at least 20 seconds. If soap and water aren't available, use an alcohol-based hand . Avoid touching your mouth, nose, and eyes. What can you do to avoid spreading the virus to others? To help avoid spreading the virus to others:  Cover your mouth with a tissue when you cough or sneeze. Then throw the tissue in the trash. Use a disinfectant to clean things that you touch often. Stay home if you are sick or have been exposed to the virus. Don't go to school, work, or public areas. And don't use public transportation. If you are sick:  Leave your home only if you need to get medical care. But call the doctor's office first so they know you're coming. And wear a face mask, if you have one. If you have a face mask, wear it whenever you're around other people. It can help stop the spread of the virus when you cough or sneeze. Clean and disinfect your home every day. Use household  and disinfectant wipes or sprays. Take special care to clean things that you grab with your hands. These include doorknobs, remote controls, phones, and handles on your refrigerator and microwave. And don't forget countertops, tabletops, bathrooms, and computer keyboards. When to call for help  Call 911 anytime you think you may need emergency care. For example, call if:  You have severe trouble breathing.  (You can't talk at all.)  You have constant chest pain or pressure. You are severely dizzy or lightheaded. You are confused or can't think clearly. Your face and lips have a blue color. You pass out (lose consciousness) or are very hard to wake up. Call your doctor now if you develop symptoms such as:  Shortness of breath. Fever. Cough. If you need to get care, call ahead to the doctor's office for instructions before you go. Make sure you wear a face mask, if you have one, to prevent exposing other people to the virus. Where can you get the latest information? The following health organizations are tracking and studying this virus. Their websites contain the most up-to-date information. Sue Diegohire also learn what to do if you think you may have been exposed to the virus. U.S. Centers for Disease Control and Prevention (CDC): The CDC provides updated news about the disease and travel advice. The website also tells you how to prevent the spread of infection. www.cdc.gov  World Health Organization [facility]): WHO offers information about the virus outbreaks. WHO also has travel advice. www.who.int  Current as of: April 1, 2020               Content Version: 12.4  © 4994-9972 [employer], [employer]. Care instructions adapted under license by your healthcare professional. If you have questions about a medical condition or this instruction, always ask your healthcare professional. [address] any warranty or liability for your use of this information. The discharge information has been reviewed with the patient and caregiver. Any questions and concerns from the patient and caregiver have been addressed. The patient and caregiver verbalized understanding.         CONTENTS FOUND IN YOUR DISCHARGE ENVELOPE:  [x]     Surgeon and Hospital Discharge Instructions  [x]     [facility] Care Provider Card  []     Medication & Side Effect Guide            (your newly prescribed medications have been marked/highlighted showing the most common side effects from   the classes of drugs on your prescriptions)  []     Medication Prescription(s) x Norco ( [] These have been sent electronically to your pharmacy by your surgeon,   - OR -       your surgeon has already provided these to you during a previous/pre-op office visit)  []     [address]  []     Physical Therapy Prescription  []     Follow-up Appointment Cards  []     Surgery-related Pictures/Media  []     Pain block and/or block with On-Q Catheter from Anesthesia Service (information included in your instructions above)  []     Medical device information sheets/pamphlets from their    []     School/work excuse note. [address]        [employer] [employer] [address]                                      [address] [facility], [address] [facility]                    Phone [phone]                        Fax [phone]  []     /parent work excuse note. The following personal items collected during your admission are returned to you:   Dental Appliance: Dental Appliances: None  Vision: Visual Aid: None  Hearing Aid:    Jewelry: Jewelry: None  Clothing: Clothing: Jacket/Coat, Pants, Shirt, Socks, Footwear, Undergarments  Other Valuables:  Other Valuables: Cell Phone  Valuables sent to safe:

## 2022-10-07 NOTE — PERIOP NOTES
Pt. Fall protocol  Yellow armband on patient, yellow non-skid socks on  Bed in low position, all side rails up, call bell within reach  Pt and family instructed \"Call don't fall\" protocol  Use your call bell and wait for assistance, staff not family will assist you to get up and move about  Pt.  And family verbalize understanding of fall precautions and \"call don't fall\" protocol

## 2022-10-07 NOTE — PERIOP NOTES
Spoke with patient's  and update on status given. All questions answered. Patient continues to sleep post procedure.

## 2022-10-08 NOTE — OP NOTES
[facility] 78  OPERATIVE REPORT    Name:  Judge Luis  MR#:  [id number]  :  1980  ACCOUNT #:  [de-identified]  DATE OF SERVICE:  10/07/2022    PREOPERATIVE DIAGNOSIS:  Abnormal uterine bleeding, suspected endometrial polyp. POSTOPERATIVE DIAGNOSIS:  Abnormal uterine bleeding, suspected endometrial polyp. PROCEDURE PERFORMED:  Hysteroscopy, D and C with MyoSure, ? polypectomy. SURGEON:  Corina Buckley MD    ASSISTANT:  None. ANESTHESIA:  General via laryngeal mask airway. COMPLICATIONS:  None. SPECIMENS REMOVED:  Endometrial curettings with possible polyp. IMPLANTS:  None. ESTIMATED BLOOD LOSS:  Minimal.    HYSTEROSCOPY FLUID:  Normal saline with a deficit of 180 cc (plus additional fluid on the floor). FINDINGS:  Fluffy endometrium consistent with menses. Thickening of endometrium along the posterior wall consistent with the ultrasound findings suspicious for endometrial polyp. Normal tubal ostia bilaterally. DRAINS:  None. COUNTS:  Correct. DISPOSITION:  To recovery room in stable condition. OPERATIVE INDICATIONS:  This is a 41-year-old female who presents with abnormal uterine bleeding. A saline infusion sonogram was performed in the office that was suspicious for a sessile polyp on the posterior wall. PROCEDURE:  Risks, benefits, and alternatives were discussed with the patient and informed consent obtained. The patient was identified in the holding area and again in the operating room. General anesthesia via laryngeal mask airway was established. She was placed in dorsal lithotomy position in [address] [place] [address], and prepped and draped in the usual sterile fashion. A surgical time-out was performed. An exam under anesthesia was performed that demonstrated an anteverted mobile uterus, slightly bulky. The cervix was visualized with the speculum and the anterior lip grasped with an Allis clamp.   The hysteroscope was easily introduced into the endometrial cavity with no dilation required. The above findings were noted. The MyoSure REACH blade was introduced through the operative port of the hysteroscope. This was used to resect the endometrium. There was some thickening noted along the posterior wall corresponding to the findings on ultrasound. No additional findings were noted. The hysteroscope and Allis clamp were removed. The patient tolerated the procedure well, was awakened in the operating room and went to recovery room in stable condition.         Celena Varela MD      [id number]  D:  10/07/2022 10:13  T:  10/07/2022 20:06  JOB #:  [id number]

## 2022-10-10 NOTE — PROGRESS NOTES
A1c is normal, no diabetes or prediabetes. LDL and total cholesterol are in goal range. Thyroid level is normal.  Electrolytes, liver and kidney function look good.

## 2022-11-03 NOTE — PROGRESS NOTES
Postop Evaluation  Pritesh Escamilla is a 36 y.o. female returns for a routine post-operative follow-up visit after undergoing the following: Hysteroscopy, D and C with MyoSure, ? polypectomy. which was done 4 weeks ago. 10/7/22  Her pathology results revealed   . * * *FINAL PATHOLOGIC DIAGNOSIS* * *     Endometrium, curettage:        Fragments consistent with benign endometrial polyp. Benign endometrium with focal condensation and crumbling. Negative for hyperplasia, atypia, and endometritis. Since the patient's surgery, she has had typical postoperative discomfort but no significant symptoms or problems since the surgery. Had bleeding like a normal period about a week after surgery. Also c/o decreased libido.     PHYSICAL EXAMINATION    Visit Vitals  BP (!) 125/90   Pulse 78   Wt 178 lb (80.7 kg)   LMP 10/05/2022   BMI 30.55 kg/m²           Gastrointestinal  Abdominal Examination: abdomen non-tender to palpation,no masses present  Liver and spleen: no hepatomegaly present, spleen not palpable  Hernias: no hernias identified    Genitourinary  External Genitalia: normal appearance for age, no discharge present, no tenderness present, no inflammatory lesions present, no masses present, no atrophy present  Vagina: normal vaginal vault without central or paravaginal defects, no discharge present, no inflammatory lesions present, no masses present  Bladder: non-tender to palpation  Urethra: appears normal  Cervix: normal   Uterus: normal size, shape and consistency  Adnexa: no adnexal tenderness present, no adnexal masses present  Perineum: perineum within normal limits, no evidence of trauma, no rashes or skin lesions present  Skin  General Inspection: no rash, no lesions identified    Neurologic/Psychiatric  Mental Status:  Orientation: grossly oriented to person, place and time  Mood and Affect: mood normal, affect appropriate    Assessment:  Normal postop checkup  Decr libido    Plan:  Needs to schedule AE/mammo  Sexual health handout given

## 2022-11-04 ENCOUNTER — OFFICE VISIT (OUTPATIENT)
Dept: OBGYN CLINIC | Age: 42
End: 2022-11-04
Payer: COMMERCIAL

## 2022-11-04 VITALS
DIASTOLIC BLOOD PRESSURE: 90 MMHG | BODY MASS INDEX: 30.55 KG/M2 | WEIGHT: 178 LBS | SYSTOLIC BLOOD PRESSURE: 125 MMHG | HEART RATE: 78 BPM

## 2022-11-04 DIAGNOSIS — Z09 POSTOP CHECK: Primary | ICD-10-CM

## 2022-11-04 PROCEDURE — 99212 OFFICE O/P EST SF 10 MIN: CPT | Performed by: OBSTETRICS & GYNECOLOGY

## 2022-12-06 ENCOUNTER — OFFICE VISIT (OUTPATIENT)
Dept: FAMILY MEDICINE CLINIC | Age: 42
End: 2022-12-06
Payer: COMMERCIAL

## 2022-12-06 VITALS
OXYGEN SATURATION: 98 % | HEART RATE: 73 BPM | HEIGHT: 64 IN | DIASTOLIC BLOOD PRESSURE: 78 MMHG | RESPIRATION RATE: 14 BRPM | TEMPERATURE: 98 F | SYSTOLIC BLOOD PRESSURE: 109 MMHG | BODY MASS INDEX: 30.9 KG/M2 | WEIGHT: 181 LBS

## 2022-12-06 DIAGNOSIS — D50.0 IRON DEFICIENCY ANEMIA DUE TO CHRONIC BLOOD LOSS: ICD-10-CM

## 2022-12-06 DIAGNOSIS — K59.00 CONSTIPATION, UNSPECIFIED CONSTIPATION TYPE: Primary | ICD-10-CM

## 2022-12-06 DIAGNOSIS — N93.8 DUB (DYSFUNCTIONAL UTERINE BLEEDING): ICD-10-CM

## 2022-12-06 PROCEDURE — 99214 OFFICE O/P EST MOD 30 MIN: CPT | Performed by: NURSE PRACTITIONER

## 2022-12-06 RX ORDER — POLYETHYLENE GLYCOL 3350 17 G/17G
17 POWDER, FOR SOLUTION ORAL AS NEEDED
COMMUNITY
End: 2022-12-06 | Stop reason: ALTCHOICE

## 2022-12-06 RX ORDER — POLYETHYLENE GLYCOL 3350 17 G/17G
POWDER, FOR SOLUTION ORAL
Qty: 510 G | Refills: 0 | Status: SHIPPED | OUTPATIENT
Start: 2022-12-06

## 2022-12-06 NOTE — PROGRESS NOTES
Amador Chen (: 1980) is a 36 y.o. female, established patient, here for evaluation of the following chief complaint(s):  Follow-up and Constipation (Pt stated that it's still the same and not feeling better. )       ASSESSMENT/PLAN:  Below is the assessment and plan developed based on review of pertinent history, physical exam, labs, studies, and medications. 1. Constipation, unspecified constipation type  Persistent symptoms  Discussed treatment options including trial off of iron supplement (with ongoing monitoring of H&H, iron profile since DUB remains persistent/untreated), trial of miralax course similar to colonoscopy prep to clear residual stool, or referral to GI for further eval and management. Patient prefers to proceed with all 3 options  Instructions given for 2 day miralax \"prep\"  She will stop iron supplement, repeat CBC in 6 weeks  Referral given to GI, she will schedule her own appt  -     REFERRAL TO GASTROENTEROLOGY  -     polyethylene glycol (MIRALAX) 17 gram/dose powder; Use as directed, Normal, Disp-510 g, R-0    2. DUB (dysfunctional uterine bleeding)  Currently untreated- patient declined ablation and hormonal IUD  We discussed reversible nature of tx with hormonal IUD, which will act locally to inhibit proliferation of uterine lining and decrease menstrual blood loss. She will spend some more time thinking about this option and contact her gyn for follow up appt    3. Iron deficiency anemia due to chronic blood loss  Due to DUB  Stopping iron supplement due to persistent constipation with abdominal discomfort and bloating  Repeat cbc, iron profile in 6 weeks    Return in about 6 weeks (around 2023), or if symptoms worsen or fail to improve, for DUB, anemia, labs. I have discussed the diagnosis with the patient and the intended plan as seen in the above orders. The patient has received an after-visit summary and questions were answered concerning future plans.  Patient conveyed understanding of the plan at the time of the visit. SUBJECTIVE/OBJECTIVE:  Presents for follow up of constipation and DUB, and evaluation of R shoulder/arm pain. Continued to c/o constipation. Notes BM increased in frequency on BID miralax but only briefly. Having BM every other day, small, hard, difficult to pass. Feels bloated constantly in the abdomen, always feels urge to defecate, never feels she has completely emptied. No blood or mucus in stool. Taking iron supplement once daily for anemia r/t DUB. Had hysteroscopy in October but no ablation. Reports gyn suggested hormonal IUD but she declined for now. Feels symptoms are unchanges. Hgb/hct were normal before procedure in October. Had injury at work, metal plate fell onto her head, injuring her head and neck. She rec'd injections into her neck for some time with good relief, that therapy has ended. Now having pain in the neck, shoulder and upper arm on the right side for the past several weeks to months, gradually worsening. Aggravated by movement of the R arm and shoulder. Interfering with sleep. Feels this is a flare of the symptoms she was having after her work injury. Past Medical History:   Diagnosis Date    Anemia     Endometrial polyp 10/10/2022    benign     Past Surgical History:   Procedure Laterality Date    HX CARPAL TUNNEL RELEASE Right 2016    HX TUBAL LIGATION  2008     Family History   Problem Relation Age of Onset    Hypertension Mother     Heart Disease Father      Social History     Tobacco Use    Smoking status: Never    Smokeless tobacco: Never   Substance Use Topics    Alcohol use: Yes     Comment: about 1 a month         Review of Systems   Constitutional: Negative. HENT: Negative. Eyes: Negative. Respiratory: Negative. Cardiovascular: Negative. Gastrointestinal:  Positive for abdominal distention, abdominal pain and constipation.  Negative for anal bleeding, blood in stool, diarrhea, nausea, rectal pain and vomiting. Endocrine: Negative. Genitourinary:  Positive for menstrual problem and vaginal bleeding. Musculoskeletal: Negative. Skin: Negative. Allergic/Immunologic: Negative. Neurological: Negative. Hematological: Negative. Psychiatric/Behavioral: Negative. Visit Vitals  /78 (BP 1 Location: Left upper arm, BP Patient Position: Sitting, BP Cuff Size: Adult)   Pulse 73   Temp 98 °F (36.7 °C) (Oral)   Resp 14   Ht 5' 4\" (1.626 m)   Wt 181 lb (82.1 kg)   SpO2 98%   BMI 31.07 kg/m²       Physical Examination: General appearance - alert, well appearing, and in no distress and oriented to person, place, and time  Mental status - normal mood, behavior, speech, dress, motor activity, and thought processes  Eyes - sclera anicteric  Neck - supple, no significant adenopathy, thyroid exam: thyroid is normal in size without nodules or tenderness  Chest - clear to auscultation, no wheezes, rales or rhonchi, symmetric air entry  Heart - normal rate, regular rhythm, normal S1, S2, no murmurs, rubs, clicks or gallops, normal bilateral carotid upstroke without bruits, no JVD  Abdomen - soft, nontender, nondistended, no masses or organomegaly  bowel sounds normal  Neurological - alert, oriented, normal speech, no focal findings or movement disorder noted  Extremities - no pedal edema noted, intact peripheral pulses, no edema, redness or tenderness in the calves or thighs  Skin - normal coloration and turgor, no rashes      An electronic signature was used to authenticate this note.   -- Sumi Corona NP   12/6/2022

## 2023-01-03 NOTE — PROGRESS NOTES
Tabatha Oleary is a 36 y.o. female      Chief Complaint   Patient presents with    Follow-up    Constipation     Pt stated that it's still the same and not feeling better. 1. Have you been to the ER, urgent care clinic since your last visit? Hospitalized since your last visit? No    2. Have you seen or consulted any other health care providers outside of the [address] [place] [address] since your last visit? Include any pap smears or colon screening.  No 03-Jan-2023

## 2023-01-18 ENCOUNTER — TELEPHONE (OUTPATIENT)
Dept: FAMILY MEDICINE CLINIC | Age: 43
End: 2023-01-18

## 2023-01-26 ENCOUNTER — OFFICE VISIT (OUTPATIENT)
Dept: OBGYN CLINIC | Age: 43
End: 2023-01-26

## 2023-01-26 VITALS
HEART RATE: 76 BPM | WEIGHT: 181 LBS | DIASTOLIC BLOOD PRESSURE: 83 MMHG | SYSTOLIC BLOOD PRESSURE: 123 MMHG | BODY MASS INDEX: 31.07 KG/M2

## 2023-01-26 DIAGNOSIS — N92.6 IRREGULAR MENSES: ICD-10-CM

## 2023-01-26 DIAGNOSIS — Z01.419 ENCOUNTER FOR WELL WOMAN EXAM: Primary | ICD-10-CM

## 2023-01-26 DIAGNOSIS — N91.5 OLIGOMENORRHEA, UNSPECIFIED TYPE: ICD-10-CM

## 2023-01-26 DIAGNOSIS — N39.46 URINARY INCONTINENCE, MIXED: ICD-10-CM

## 2023-01-26 DIAGNOSIS — Z12.4 PAP SMEAR FOR CERVICAL CANCER SCREENING: ICD-10-CM

## 2023-01-26 PROCEDURE — 99396 PREV VISIT EST AGE 40-64: CPT | Performed by: OBSTETRICS & GYNECOLOGY

## 2023-01-26 RX ORDER — MEDROXYPROGESTERONE ACETATE 10 MG/1
10 TABLET ORAL DAILY
Qty: 30 TABLET | Refills: 1 | Status: SHIPPED | OUTPATIENT
Start: 2023-01-26

## 2023-01-26 NOTE — PROGRESS NOTES
Per Nursing Note:  Patient's last menstrual period was 2023. Her periods are normal in flow and often irregular with no apparent pattern. She does not have dysmenorrhea. Problems: no significant problems  Birth Control:  tubal ligation. Last Pap: unk. She does not have a history of GABE 2, 3 or cervical cancer. Last Mammogram: had her mammogram today in our office. Annual exam      Tila Sears is a ,  36 y.o. female   Patient's last menstrual period was 2023. Continued through . Seeing small amount of bleeding today. No period from surgery until December (22, lasted 8 days). LV=22 postop. HS (10/7/22) benign polyps    She presents for her annual checkup. Irregular cycles    C/o urinary incontinence - with exercise, coughing, sneezing. Also urinary urgency, may have incont with this as well. Menstrual status:    Periods irregular  Was regular until May. Had hysteroscopy 10/7/22 for benign polyp. Did not have period until . Then -. Some light bleeding today. The current method of family planning is tubal ligation. Sexual history:    She  reports being sexually active and has had partner(s) who are male. She reports using the following method of birth control/protection: Surgical.      Pap Smear:  She does not have a history of abnormal paps.               Past Medical History:   Diagnosis Date    Anemia     Endometrial polyp 10/10/2022    benign     Past Surgical History:   Procedure Laterality Date    HX CARPAL TUNNEL RELEASE Right 2016    HX TUBAL LIGATION      DE HYSTEROSCOPY BX ENDOMETRIUM&/POLYPC W/WO D&C       OB History    Para Term  AB Living   3 3 3     3   SAB IAB Ectopic Molar Multiple Live Births             3      # Outcome Date GA Lbr Juve/2nd Weight Sex Delivery Anes PTL Lv   3 Term     M Vag-Spont   TANIA   2 Term     F Vag-Spont   TANIA   1 Term     M Vag-Spont   TANIA Current Outpatient Medications   Medication Sig Dispense Refill    polyethylene glycol (MIRALAX) 17 gram/dose powder Use as directed 510 g 0    triamcinolone acetonide (KENALOG) 0.025 % ointment Apply  to affected area two (2) times a day. use thin layer 80 g 0    ibuprofen (MOTRIN) 800 mg tablet       ZINC PO Take  by mouth. MAGNESIUM PO Take  by mouth. cetirizine HCl (CETIRIZINE PO) Take  by mouth as needed. cholecalciferol, vitamin D3, (VITAMIN D3 PO) Take  by mouth. diphenhydramine HCl (BENADRYL PO) Take  by mouth as needed. acetaminophen (TYLENOL) 500 mg tablet Take 2 Tablets by mouth every six (6) hours as needed for Pain. 20 Tablet 0    iron,carb/vit C/vit B12/folic (IRON 882 PLUS PO) Take  by mouth. Allergies: Patient has no known allergies. Tobacco History:  reports that she has never smoked. She has never used smokeless tobacco.  Alcohol Abuse:  reports current alcohol use. Drug Abuse:  reports no history of drug use.     Family Medical/Cancer History:   Family History   Problem Relation Age of Onset    Hypertension Mother     Heart Disease Father     Heart Disease Sister         Review of Systems - History obtained from the patient  Constitutional: negative for weight loss, fever, night sweats  HEENT: negative for hearing loss, earache, congestion, snoring, sorethroat  CV: negative for chest pain, palpitations, edema  Resp: negative for cough, shortness of breath, wheezing  GI: negative for change in bowel habits, abdominal pain, black or bloody stools  : negative for  dysuria, hematuria, vaginal discharge  MSK: negative for back pain, joint pain, muscle pain  Breast: negative for breast lumps, nipple discharge, galactorrhea  Skin :negative for itching, rash, hives  Neuro: negative for dizziness, headache, confusion, weakness  Psych: negative for anxiety, depression, change in mood  Heme/lymph: negative for bleeding, bruising, pallor    Physical Exam    Visit Vitals  /83   Pulse 76   Wt 181 lb (82.1 kg)   LMP 01/11/2023   BMI 31.07 kg/m²       Constitutional  Appearance: well-nourished, well developed, alert, in no acute distress    HENT  Head and Face: appears normal    Neck  Inspection/Palpation: normal appearance, no masses or tenderness  Lymph Nodes: no lymphadenopathy present  Thyroid: gland size normal, nontender, no nodules or masses present on palpation    Chest  Respiratory Effort: breathing unlabored  Auscultation: normal breath sounds    Cardiovascular  Heart:   Auscultation: regular rate and rhythm without murmur    Breasts  Inspection of Breasts: breasts symmetrical, no skin changes, no discharge present, nipple appearance normal, no skin retraction present  Palpation of Breasts and Axillae: no masses present on palpation, no breast tenderness  Axillary Lymph Nodes: no lymphadenopathy present    Gastrointestinal  Abdominal Examination: abdomen non-tender to palpation, normal bowel sounds, no masses present  Liver and spleen: no hepatomegaly present, spleen not palpable  Hernias: no hernias identified    Genitourinary  External Genitalia: normal appearance for age, no discharge present, no tenderness present, no inflammatory lesions present, no masses present, no atrophy present  Vagina: normal vaginal vault, no discharge present, no inflammatory lesions present, no masses present; small amt blood  Bladder: non-tender to palpation  Urethra: appears normal  Cervix: normal   Uterus: normal size, shape and consistency  Adnexa: no adnexal tenderness present, no adnexal masses present  Perineum: perineum within normal limits, no evidence of trauma, no rashes or skin lesions present  Anus: anus within normal limits, no hemorrhoids present  Inguinal Lymph Nodes: no lymphadenopathy present    Skin  General Inspection: no rash, no lesions identified    Neurologic/Psychiatric  Mental Status:  Orientation: grossly oriented to person, place and time  Mood and Affect: mood normal, affect appropriate    Assessment & Plan:  Routine gynecologic examination. Pap/HPV today  Irregular cycles. Provera. Menstrual calendar. Check PRL. Had nl TSH in Oct.  Mixed urinary incont -> consult Dr. Aaron Moran re: diet, exercise, healthy lifestyle  Return for yearly wellness visits  Gardisil counseling provided  Pt counseled regarding co-testing for high risk HPV with pap  Rec screening mammo. BR-1 today  Patient verbalized understanding    Orders Placed This Encounter    PROLACTIN     Standing Status:   Future     Standing Expiration Date:   1/26/2024    medroxyPROGESTERone (PROVERA) 10 mg tablet     Sig: Take 1 Tablet by mouth daily. Take 10d/mo     Dispense:  30 Tablet     Refill:  1    PAP IG, APTIMA HPV AND RFX 58/36,90 ([id number])     Order Specific Question:   Pap Source? Answer:   Cervical and Endocervical     Order Specific Question:   Total Hysterectomy? Answer:   No     Order Specific Question:   Supracervical Hysterectomy? Answer:   No     Order Specific Question:   Post Menopausal?     Answer:   No     Order Specific Question:   Hormone Therapy? Answer:   No     Order Specific Question:   IUD? Answer:   No     Order Specific Question:   Abnormal Bleeding? Answer:   Yes     Order Specific Question:   Pregnant     Answer:   No     Order Specific Question:   Post Partum? Answer:    No

## 2023-01-26 NOTE — PROGRESS NOTES
Rodney Leigh is a 36 y.o. female returns for an annual exam     Chief Complaint   Patient presents with    Well Woman       Patient's last menstrual period was 01/26/2023. Her periods are normal in flow and often irregular with no apparent pattern. She does not have dysmenorrhea. Problems: no significant problems  Birth Control:  tubal ligation. Last Pap: unk. She does not have a history of GABE 2, 3 or cervical cancer. Last Mammogram: had her mammogram today in our office. 1. Have you been to the ER, urgent care clinic, or hospitalized since your last visit? No    2. Have you seen or consulted any other health care providers outside of the [address] [place] [address] since your last visit?  No

## 2023-01-27 LAB — PROLACTIN SERPL-MCNC: 9 NG/ML

## 2023-02-03 ENCOUNTER — OFFICE VISIT (OUTPATIENT)
Dept: FAMILY MEDICINE CLINIC | Age: 43
End: 2023-02-03
Payer: COMMERCIAL

## 2023-02-03 VITALS
HEIGHT: 64 IN | OXYGEN SATURATION: 100 % | BODY MASS INDEX: 31.07 KG/M2 | WEIGHT: 182 LBS | SYSTOLIC BLOOD PRESSURE: 122 MMHG | TEMPERATURE: 97.9 F | DIASTOLIC BLOOD PRESSURE: 79 MMHG | RESPIRATION RATE: 16 BRPM | HEART RATE: 83 BPM

## 2023-02-03 DIAGNOSIS — Z83.3 FAMILY HISTORY OF DIABETES MELLITUS (DM): ICD-10-CM

## 2023-02-03 DIAGNOSIS — N93.8 DUB (DYSFUNCTIONAL UTERINE BLEEDING): ICD-10-CM

## 2023-02-03 DIAGNOSIS — K59.00 CONSTIPATION, UNSPECIFIED CONSTIPATION TYPE: ICD-10-CM

## 2023-02-03 DIAGNOSIS — D50.0 IRON DEFICIENCY ANEMIA DUE TO CHRONIC BLOOD LOSS: ICD-10-CM

## 2023-02-03 DIAGNOSIS — R63.1 POLYDIPSIA: ICD-10-CM

## 2023-02-03 DIAGNOSIS — R53.83 FATIGUE, UNSPECIFIED TYPE: Primary | ICD-10-CM

## 2023-02-03 NOTE — PROGRESS NOTES
Lawyer Red (: 1980) is a 36 y.o. female, established patient, here for evaluation of the following chief complaint(s):  Follow-up (6 wk follow-up, DUB, anemia, labs)       ASSESSMENT/PLAN:  Below is the assessment and plan developed based on review of pertinent history, physical exam, labs, studies, and medications. 1. Fatigue, unspecified type  Anemia vs thyroid dysfunctions vs vit d def vs iraida vs other  Check labs  Continue healthy diet, regular exercise, 8 hours sleep  Does report snoring- favor sleep medicine eval if labs unrevealing  -     METABOLIC PANEL, COMPREHENSIVE; Future  -     VITAMIN D, 25 HYDROXY; Future  -     TSH 3RD GENERATION; Future    2. DUB (dysfunctional uterine bleeding)  3. Iron deficiency anemia due to chronic blood loss  Now on provera to control bleeding  Check CBC, iron profile  -     CBC W/O DIFF; Future  -     IRON PROFILE; Future    4. Constipation, unspecified constipation type  Modest improvement off of iron supplement  Suggested daily metamucil rather than prn  Keep GI appt as scheduled for further eval and management    5. Polydipsia  6. Family history of diabetes mellitus (DM)  Check a1c  -     HEMOGLOBIN A1C WITH EAG; Future      Return in about 3 months (around 5/3/2023), or if symptoms worsen or fail to improve, for fatigue. I have discussed the diagnosis with the patient and the intended plan as seen in the above orders. The patient has received an after-visit summary and questions were answered concerning future plans. Patient conveyed understanding of the plan at the time of the visit. SUBJECTIVE/OBJECTIVE:  Presents for follow-up of dysfunctional uterine bleeding, iron deficiency anemia, constipation, and evaluation of fatigue, polydipsia/polyuria. Patient had visit with her gynecologist and is now taking Provera daily to help reduce bleeding. Reports continued fatigue, sleeps 8 to 9 hours a night and is following a healthy diet plan.   She exercises regularly. She is having difficulty losing weight as well, asks about ozempic to help with this. She does report snoring at night. Complains of increased frequency of urination and increased thirst.  Family history of diabetes, patient is concerned she has developed abnormal blood sugars. She has not checked her blood sugars at home. She will be having some testing of her bladder with Dr. Leandra Barksdale next week for further evaluation of the urinary frequency. Reports some improvement in constipation since stopping iron supplement with increased frequency of stools. She does continue to experience periods of several days where she does not have a bowel movement with associated abdominal discomfort and bloating. Taking MiraLAX or Metamucil helps improve the symptoms. She has an appointment scheduled with gastroenterology for further evaluation and management. No blood or mucus noted in stool. Diagnosis Date    Anemia     Endometrial polyp 10/10/2022    benign    Screening mammogram for breast cancer 01/26/2023    BIRADS 1: Negative. No mammographic evidence of malignancy     Past Surgical History:   Procedure Laterality Date    HX CARPAL TUNNEL RELEASE Right 2016    HX TUBAL LIGATION  2008    [place] HYSTEROSCOPY BX ENDOMETRIUM&/POLYPC W/WO D&C  2022     Family History   Problem Relation Age of Onset    Hypertension Mother     Heart Disease Father     Heart Disease Sister      Social History     Tobacco Use    Smoking status: Never    Smokeless tobacco: Never   Substance Use Topics    Alcohol use: Yes     Comment: about 1 a month         Review of Systems   Constitutional:  Positive for fatigue. HENT: Negative. Eyes: Negative. Respiratory: Negative. Cardiovascular: Negative. Gastrointestinal:  Positive for abdominal pain and constipation. Negative for anal bleeding, blood in stool, nausea and vomiting. Endocrine: Negative.     Genitourinary:  Positive for frequency and menstrual problem. Musculoskeletal: Negative. Skin: Negative. Allergic/Immunologic: Negative. Neurological: Negative. Hematological: Negative. Psychiatric/Behavioral: Negative. Visit Vitals  /79 (BP 1 Location: Left upper arm, BP Patient Position: Sitting, BP Cuff Size: Large adult)   Pulse 83   Temp 97.9 °F (36.6 °C) (Oral)   Resp 16   Ht 5' 4\" (1.626 m)   Wt 182 lb (82.6 kg)   SpO2 100%   BMI 31.24 kg/m²     Physical Examination: General appearance - alert, well appearing, and in no distress and oriented to person, place, and time  Mental status - normal mood, behavior, speech, dress, motor activity, and thought processes  Eyes - sclera anicteric  Neck - supple, no significant adenopathy, thyroid exam: thyroid is normal in size without nodules or tenderness  Chest - clear to auscultation, no wheezes, rales or rhonchi, symmetric air entry  Heart - normal rate, regular rhythm, normal S1, S2, no murmurs, rubs, clicks or gallops, normal bilateral carotid upstroke without bruits, no JVD  Abdomen - soft, nontender, nondistended, no masses or organomegaly  bowel sounds normal  Neurological - alert, oriented, normal speech, no focal findings or movement disorder noted  Extremities - no pedal edema noted, intact peripheral pulses, no edema, redness or tenderness in the calves or thighs  Skin - normal coloration and turgor, no rashes      An electronic signature was used to authenticate this note.   -- Bairon Strong NP   2/3/2023

## 2023-02-03 NOTE — PROGRESS NOTES
Identified pt with two pt identifiers(name and ). Reviewed record in preparation for visit and have obtained necessary documentation. Chief Complaint   Patient presents with    Follow-up     6 wk follow-up, DUB, anemia, labs        Health Maintenance Due   Topic    Hepatitis C Screening     COVID-19 Vaccine (1)    DTaP/Tdap/Td series (1 - Tdap)    Flu Vaccine (1)      Visit Vitals  /79 (BP 1 Location: Left upper arm, BP Patient Position: Sitting, BP Cuff Size: Large adult)   Pulse 83   Temp 97.9 °F (36.6 °C) (Oral)   Resp 16   Ht 5' 4\" (1.626 m)   Wt 182 lb (82.6 kg)   LMP 2023   SpO2 100%   BMI 31.24 kg/m²     Pain Scale: 0 - No pain/10    Coordination of Care Questionnaire:  :   1. Have you been to the ER, urgent care clinic since your last visit? Hospitalized since your last visit? No    2. Have you seen or consulted any other health care providers outside of the [address] [place] [address] since your last visit? Include any pap smears or colon screening.  No

## 2023-02-04 LAB
25(OH)D3 SERPL-MCNC: 44.8 NG/ML (ref 30–100)
ALBUMIN SERPL-MCNC: 3.3 G/DL (ref 3.5–5)
ALBUMIN/GLOB SERPL: 1.1 (ref 1.1–2.2)
ALP SERPL-CCNC: 59 U/L (ref 45–117)
ALT SERPL-CCNC: 32 U/L (ref 12–78)
ANION GAP SERPL CALC-SCNC: 6 MMOL/L (ref 5–15)
AST SERPL-CCNC: 21 U/L (ref 15–37)
BILIRUB SERPL-MCNC: 0.3 MG/DL (ref 0.2–1)
BUN SERPL-MCNC: 12 MG/DL (ref 6–20)
BUN/CREAT SERPL: 21 (ref 12–20)
CALCIUM SERPL-MCNC: 8.9 MG/DL (ref 8.5–10.1)
CHLORIDE SERPL-SCNC: 111 MMOL/L (ref 97–108)
CO2 SERPL-SCNC: 22 MMOL/L (ref 21–32)
CREAT SERPL-MCNC: 0.57 MG/DL (ref 0.55–1.02)
ERYTHROCYTE [DISTWIDTH] IN BLOOD BY AUTOMATED COUNT: 13.3 % (ref 11.5–14.5)
EST. AVERAGE GLUCOSE BLD GHB EST-MCNC: 91 MG/DL
GLOBULIN SER CALC-MCNC: 3.1 G/DL (ref 2–4)
GLUCOSE SERPL-MCNC: 88 MG/DL (ref 65–100)
HBA1C MFR BLD: 4.8 % (ref 4–5.6)
HCT VFR BLD AUTO: 38.4 % (ref 35–47)
HGB BLD-MCNC: 12.6 G/DL (ref 11.5–16)
IRON SATN MFR SERPL: 20 % (ref 20–50)
IRON SERPL-MCNC: 75 UG/DL (ref 35–150)
MCH RBC QN AUTO: 29.1 PG (ref 26–34)
MCHC RBC AUTO-ENTMCNC: 32.8 G/DL (ref 30–36.5)
MCV RBC AUTO: 88.7 FL (ref 80–99)
NRBC # BLD: 0 K/UL (ref 0–0.01)
NRBC BLD-RTO: 0 PER 100 WBC
PLATELET # BLD AUTO: 333 K/UL (ref 150–400)
PMV BLD AUTO: 11.2 FL (ref 8.9–12.9)
POTASSIUM SERPL-SCNC: 4.1 MMOL/L (ref 3.5–5.1)
PROT SERPL-MCNC: 6.4 G/DL (ref 6.4–8.2)
RBC # BLD AUTO: 4.33 M/UL (ref 3.8–5.2)
SODIUM SERPL-SCNC: 139 MMOL/L (ref 136–145)
TIBC SERPL-MCNC: 370 UG/DL (ref 250–450)
TSH SERPL DL<=0.05 MIU/L-ACNC: 1.66 UIU/ML (ref 0.36–3.74)
WBC # BLD AUTO: 7.4 K/UL (ref 3.6–11)

## 2023-02-09 ENCOUNTER — OFFICE VISIT (OUTPATIENT)
Dept: OBGYN CLINIC | Age: 43
End: 2023-02-09
Payer: COMMERCIAL

## 2023-02-09 VITALS — SYSTOLIC BLOOD PRESSURE: 113 MMHG | DIASTOLIC BLOOD PRESSURE: 75 MMHG | BODY MASS INDEX: 30.79 KG/M2 | WEIGHT: 179.4 LBS

## 2023-02-09 DIAGNOSIS — N39.46 URINARY INCONTINENCE, MIXED: Primary | ICD-10-CM

## 2023-02-09 NOTE — PROGRESS NOTES
Tonio Garcia is a 36 y.o. female presents for a problem visit. Problems: IRVING and menorrhagia    Patient's last menstrual period was 2023. Birth Control: tubal ligation. Last Pap: normal obtained 1 month ago        1. Have you been to the ER, urgent care clinic, or hospitalized since your last visit? No    2. Have you seen or consulted any other health care providers outside of the [address] [place] [address] since your last visit? No        Chart reviewed for the following:   I, Rafia Perez LPN, have reviewed the History, Physical and updated the Allergic reactions for [place] performed immediately prior to start of procedure:   Sheri Abel LPN, have performed the following reviews on Tonio Garcia prior to the start of the procedure:            * Patient was identified by name and date of birth   * Agreement on procedure being performed was verified  * Risks and Benefits explained to the patient  * Procedure site verified and marked as necessary  * Patient was positioned for comfort  * Consent was signed and verified     Time: 2:30 pm    Date of procedure: 2023    Procedure performed by: Leo Murray MD    Provider assisted by: Rafia Perez LPN    Patient assisted by: self    How tolerated by patient: tolerated the procedure well with no complications    Post Procedural Pain Scale: 0 - No Hurt    Comments: none    Examination chaperoned by Rafia Perez LPN. Procedure note: Urodynamics      Indications:  Tonio Garcia is a ,  36 y.o. female / Patient's last menstrual period was 2023. She complains of moderate and life-altering symptoms of stress incontinence and menorrhagia for years. She is here today for urodynamic testing. Also wants ablation. Procedure: Urodynamic testing was performed with the Lumax machine. Pressure flow testing via Lumax device was performed. Voided volume was 85 cc. Maximum flow rate achieved was 15.8 cc/sec. Average flow rate was 10.7 cc/sec. Post void residual was 15 cc. She felt the first filling sensation at 189 cc, had the urge to urinate at 254 cc, strong urge at 295 cc, and very uncomfortable at 295 cc. Bladder filling showed normal pressures   The total bladder volume achieved was 295 cc. Leak point pressure was achieved at 37 cm of water. The leak volume was 5 cc. Q-tip deflection was 15 degrees. She was noted to lose urine when she coughed in the sitting position. There was no urine loss with manual support of one side of the urethra. EMG was not completed. Urethral sphincter length is 2 cm. Maximum urethral closure pressure was 52 cm of water. Impression:  Pure USI. Bladder capacity 300 cc. Wants ablation also. Had TL so not need contraception  Plan:  TVT-O and Dot endometrial ablation. IC obtained and pt states NFQ.       Care Provider: Justice Cherry M.D.

## 2023-03-22 ENCOUNTER — ANESTHESIA EVENT (OUTPATIENT)
Dept: SURGERY | Age: 43
End: 2023-03-22
Payer: COMMERCIAL

## 2023-03-22 NOTE — PERIOP NOTES
[address] , [address] [facility]   MAIN OR                                  [phone]   MAIN PRE OP                          [phone]                                                                                AMBULATORY PRE OP          [phone]  PRE-ADMISSION TESTING    [phone]   Surgery Date:  tomorrow 3/23       Is surgery arrival time given by surgeon? NO  If NO, [facility] staff will call you between 3 and 7pm the day before your surgery with your arrival time. (If your surgery is on a Monday, we will call you the Friday before.)    Call [phone] after 7pm Monday-Friday if you did not receive this call. INSTRUCTIONS BEFORE YOUR SURGERY   When You  Arrive Arrive at the [address] [facility] on the day of your surgery  Have your insurance card, photo ID, and any copayment (if needed)   Food   and   Drink NO food or drink after midnight the night before surgery    This means NO water, gum, mints, coffee, juice, etc.  No alcohol (beer, wine, liquor) 24 hours before and after surgery   Medications to   TAKE   Morning of Surgery MEDICATIONS TO TAKE THE MORNING OF SURGERY WITH A SIP OF WATER:   Tylenol if needed    Medications  To  STOP      7 days before surgery Non-Steroidal anti-inflammatory Drugs (NSAID's): for example, Ibuprofen (Advil, Motrin), Naproxen (Aleve)  Aspirin, if taking for pain   Herbal supplements, vitamins, and fish oil  Other:  (Pain medications not listed above, including Tylenol may be taken)       Bathing Clothing  Jewelry  Valuables     If you shower the morning of surgery, please do not apply anything to your skin (lotions, powders, deodorant, or makeup, especially mascara)  Follow [employer]lorhex[employer] body wash instructions provided to you during PAT appointment. Begin 3 days prior to surgery.   Do not shave or trim anywhere 24 hours before surgery  Wear your hair loose or down; no pony-tails, buns, or metal hair clips  Wear loose, comfortable, clean clothes  Wear glasses instead of contacts  Leave money, valuables, and jewelry, including body piercings, at home     Going Home - or Spending the Night SAME-DAY SURGERY: You must have a responsible adult drive you home and stay with you 24 hours after surgery  ADMITS: If your doctor is keeping you in the hospital after surgery, leave personal belongings/luggage in your car until you have a hospital room number. Hospital discharge time is 12 noon  Drivers must be here before 12 noon unless you are told differently   Special Instructions None         Follow all instructions so your surgery wont be cancelled. Please, be on time. If a situation occurs and you are delayed the day of surgery, call [phone] or [phone]. If your physical condition changes (like a fever, cold, flu, etc.) call your surgeon. Home medication(s) reviewed and verified via     LIST   VERBAL   during PAT appointment. The patient was contacted by      IN-PERSON  The patient verbalizes understanding of all instructions and    DOES NOT   need reinforcement.

## 2023-03-23 ENCOUNTER — HOSPITAL ENCOUNTER (OUTPATIENT)
Age: 43
Setting detail: OUTPATIENT SURGERY
Discharge: HOME | End: 2023-03-23
Attending: OBSTETRICS & GYNECOLOGY | Admitting: OBSTETRICS & GYNECOLOGY
Payer: COMMERCIAL

## 2023-03-23 ENCOUNTER — ANESTHESIA (OUTPATIENT)
Dept: SURGERY | Age: 43
End: 2023-03-23
Payer: COMMERCIAL

## 2023-03-23 VITALS
OXYGEN SATURATION: 98 % | SYSTOLIC BLOOD PRESSURE: 116 MMHG | DIASTOLIC BLOOD PRESSURE: 77 MMHG | WEIGHT: 164.46 LBS | BODY MASS INDEX: 28.08 KG/M2 | HEART RATE: 77 BPM | HEIGHT: 64 IN | RESPIRATION RATE: 16 BRPM | TEMPERATURE: 97.9 F

## 2023-03-23 DIAGNOSIS — N92.6 IRREGULAR MENSTRUAL CYCLE: ICD-10-CM

## 2023-03-23 DIAGNOSIS — N39.3 STRESS INCONTINENCE, FEMALE: ICD-10-CM

## 2023-03-23 DIAGNOSIS — G89.18 POSTOPERATIVE PAIN: Primary | ICD-10-CM

## 2023-03-23 LAB
ERYTHROCYTE [DISTWIDTH] IN BLOOD BY AUTOMATED COUNT: 14.8 % (ref 11.5–14.5)
HCG UR QL: NEGATIVE
HCT VFR BLD AUTO: 39 % (ref 35–47)
HGB BLD-MCNC: 13.2 G/DL (ref 11.5–16)
MCH RBC QN AUTO: 29.8 PG (ref 26–34)
MCHC RBC AUTO-ENTMCNC: 33.8 G/DL (ref 30–36.5)
MCV RBC AUTO: 88 FL (ref 80–99)
NRBC # BLD: 0 K/UL (ref 0–0.01)
NRBC BLD-RTO: 0 PER 100 WBC
PLATELET # BLD AUTO: 281 K/UL (ref 150–400)
PMV BLD AUTO: 10.8 FL (ref 8.9–12.9)
RBC # BLD AUTO: 4.43 M/UL (ref 3.8–5.2)
WBC # BLD AUTO: 5 K/UL (ref 3.6–11)

## 2023-03-23 PROCEDURE — C1771 REP DEV, URINARY, W/SLING: HCPCS | Performed by: OBSTETRICS & GYNECOLOGY

## 2023-03-23 PROCEDURE — 74011000250 HC RX REV CODE- 250: Performed by: OBSTETRICS & GYNECOLOGY

## 2023-03-23 PROCEDURE — 2709999900 HC NON-CHARGEABLE SUPPLY: Performed by: OBSTETRICS & GYNECOLOGY

## 2023-03-23 PROCEDURE — 77030019927 HC TBNG IRR CYSTO BAXT -A: Performed by: OBSTETRICS & GYNECOLOGY

## 2023-03-23 PROCEDURE — 81025 URINE PREGNANCY TEST: CPT

## 2023-03-23 PROCEDURE — 76060000033 HC ANESTHESIA 1 TO 1.5 HR: Performed by: OBSTETRICS & GYNECOLOGY

## 2023-03-23 PROCEDURE — 76010000149 HC OR TIME 1 TO 1.5 HR: Performed by: OBSTETRICS & GYNECOLOGY

## 2023-03-23 PROCEDURE — 77030005513 HC CATH URETH FOL11 MDII -B: Performed by: OBSTETRICS & GYNECOLOGY

## 2023-03-23 PROCEDURE — 76210000005 HC OR PH I REC 5 TO 5.5 HR: Performed by: OBSTETRICS & GYNECOLOGY

## 2023-03-23 PROCEDURE — 74011250636 HC RX REV CODE- 250/636: Performed by: OBSTETRICS & GYNECOLOGY

## 2023-03-23 PROCEDURE — 85027 COMPLETE CBC AUTOMATED: CPT

## 2023-03-23 PROCEDURE — 77030020143 HC AIRWY LARYN INTUB CGAS -A: Performed by: NURSE ANESTHETIST, CERTIFIED REGISTERED

## 2023-03-23 PROCEDURE — 77030040922 HC BLNKT HYPOTHRM STRY -A

## 2023-03-23 PROCEDURE — 76210000020 HC REC RM PH II FIRST 0.5 HR: Performed by: OBSTETRICS & GYNECOLOGY

## 2023-03-23 PROCEDURE — 74011250636 HC RX REV CODE- 250/636: Performed by: ANESTHESIOLOGY

## 2023-03-23 PROCEDURE — 77030031139 HC SUT VCRL2 J&J -A: Performed by: OBSTETRICS & GYNECOLOGY

## 2023-03-23 PROCEDURE — 77030040361 HC SLV COMPR DVT MDII -B

## 2023-03-23 PROCEDURE — 74011250636 HC RX REV CODE- 250/636: Performed by: NURSE ANESTHETIST, CERTIFIED REGISTERED

## 2023-03-23 PROCEDURE — 74011000250 HC RX REV CODE- 250: Performed by: NURSE ANESTHETIST, CERTIFIED REGISTERED

## 2023-03-23 PROCEDURE — 77030041423 HC SYST FLUID MNGMT FLUENT HOLO -D: Performed by: OBSTETRICS & GYNECOLOGY

## 2023-03-23 PROCEDURE — 36415 COLL VENOUS BLD VENIPUNCTURE: CPT

## 2023-03-23 DEVICE — TRANSOBTURATOR SLING SYSTEM WITH PRECISIONBLUE™ DESIGN
Type: IMPLANTABLE DEVICE | Site: OTHER | Status: FUNCTIONAL
Brand: OBTRYX™ II SYSTEM - HALO

## 2023-03-23 RX ORDER — LIDOCAINE HYDROCHLORIDE 20 MG/ML
INJECTION, SOLUTION EPIDURAL; INFILTRATION; INTRACAUDAL; PERINEURAL AS NEEDED
Status: DISCONTINUED | OUTPATIENT
Start: 2023-03-23 | End: 2023-03-23 | Stop reason: HOSPADM

## 2023-03-23 RX ORDER — DIPHENHYDRAMINE HYDROCHLORIDE 50 MG/ML
12.5 INJECTION, SOLUTION INTRAMUSCULAR; INTRAVENOUS AS NEEDED
Status: DISCONTINUED | OUTPATIENT
Start: 2023-03-23 | End: 2023-03-23 | Stop reason: HOSPADM

## 2023-03-23 RX ORDER — BUPIVACAINE HYDROCHLORIDE AND EPINEPHRINE 2.5; 5 MG/ML; UG/ML
INJECTION, SOLUTION EPIDURAL; INFILTRATION; INTRACAUDAL; PERINEURAL AS NEEDED
Status: DISCONTINUED | OUTPATIENT
Start: 2023-03-23 | End: 2023-03-23 | Stop reason: HOSPADM

## 2023-03-23 RX ORDER — KETOROLAC TROMETHAMINE 30 MG/ML
INJECTION, SOLUTION INTRAMUSCULAR; INTRAVENOUS AS NEEDED
Status: DISCONTINUED | OUTPATIENT
Start: 2023-03-23 | End: 2023-03-23 | Stop reason: HOSPADM

## 2023-03-23 RX ORDER — DEXAMETHASONE SODIUM PHOSPHATE 4 MG/ML
INJECTION, SOLUTION INTRA-ARTICULAR; INTRALESIONAL; INTRAMUSCULAR; INTRAVENOUS; SOFT TISSUE AS NEEDED
Status: DISCONTINUED | OUTPATIENT
Start: 2023-03-23 | End: 2023-03-23 | Stop reason: HOSPADM

## 2023-03-23 RX ORDER — MIDAZOLAM HYDROCHLORIDE 1 MG/ML
INJECTION, SOLUTION INTRAMUSCULAR; INTRAVENOUS AS NEEDED
Status: DISCONTINUED | OUTPATIENT
Start: 2023-03-23 | End: 2023-03-23 | Stop reason: HOSPADM

## 2023-03-23 RX ORDER — GLYCOPYRROLATE 0.2 MG/ML
INJECTION INTRAMUSCULAR; INTRAVENOUS AS NEEDED
Status: DISCONTINUED | OUTPATIENT
Start: 2023-03-23 | End: 2023-03-23 | Stop reason: HOSPADM

## 2023-03-23 RX ORDER — SODIUM CHLORIDE, SODIUM LACTATE, POTASSIUM CHLORIDE, CALCIUM CHLORIDE 600; 310; 30; 20 MG/100ML; MG/100ML; MG/100ML; MG/100ML
75 INJECTION, SOLUTION INTRAVENOUS CONTINUOUS
Status: DISCONTINUED | OUTPATIENT
Start: 2023-03-23 | End: 2023-03-23 | Stop reason: HOSPADM

## 2023-03-23 RX ORDER — FENTANYL CITRATE 50 UG/ML
INJECTION, SOLUTION INTRAMUSCULAR; INTRAVENOUS AS NEEDED
Status: DISCONTINUED | OUTPATIENT
Start: 2023-03-23 | End: 2023-03-23 | Stop reason: HOSPADM

## 2023-03-23 RX ORDER — SODIUM CHLORIDE, SODIUM LACTATE, POTASSIUM CHLORIDE, CALCIUM CHLORIDE 600; 310; 30; 20 MG/100ML; MG/100ML; MG/100ML; MG/100ML
125 INJECTION, SOLUTION INTRAVENOUS CONTINUOUS
Status: DISCONTINUED | OUTPATIENT
Start: 2023-03-23 | End: 2023-03-23 | Stop reason: HOSPADM

## 2023-03-23 RX ORDER — ONDANSETRON 2 MG/ML
INJECTION INTRAMUSCULAR; INTRAVENOUS AS NEEDED
Status: DISCONTINUED | OUTPATIENT
Start: 2023-03-23 | End: 2023-03-23 | Stop reason: HOSPADM

## 2023-03-23 RX ORDER — EPHEDRINE SULFATE/0.9% NACL/PF 50 MG/5 ML
SYRINGE (ML) INTRAVENOUS AS NEEDED
Status: DISCONTINUED | OUTPATIENT
Start: 2023-03-23 | End: 2023-03-23 | Stop reason: HOSPADM

## 2023-03-23 RX ORDER — HYDROMORPHONE HYDROCHLORIDE 1 MG/ML
.25-1 INJECTION, SOLUTION INTRAMUSCULAR; INTRAVENOUS; SUBCUTANEOUS
Status: DISCONTINUED | OUTPATIENT
Start: 2023-03-23 | End: 2023-03-23 | Stop reason: HOSPADM

## 2023-03-23 RX ORDER — PROPOFOL 10 MG/ML
INJECTION, EMULSION INTRAVENOUS AS NEEDED
Status: DISCONTINUED | OUTPATIENT
Start: 2023-03-23 | End: 2023-03-23 | Stop reason: HOSPADM

## 2023-03-23 RX ORDER — OXYCODONE AND ACETAMINOPHEN 5; 325 MG/1; MG/1
1 TABLET ORAL
Qty: 12 TABLET | Refills: 0 | Status: SHIPPED | OUTPATIENT
Start: 2023-03-23 | End: 2023-03-28

## 2023-03-23 RX ADMIN — GLYCOPYRROLATE 0.1 MG: 0.2 INJECTION INTRAMUSCULAR; INTRAVENOUS at 10:17

## 2023-03-23 RX ADMIN — SODIUM CHLORIDE, POTASSIUM CHLORIDE, SODIUM LACTATE AND CALCIUM CHLORIDE: 600; 310; 30; 20 INJECTION, SOLUTION INTRAVENOUS at 10:17

## 2023-03-23 RX ADMIN — CEFAZOLIN SODIUM 2 G: 1 POWDER, FOR SOLUTION INTRAMUSCULAR; INTRAVENOUS at 10:33

## 2023-03-23 RX ADMIN — PROPOFOL 50 MG: 10 INJECTION, EMULSION INTRAVENOUS at 10:49

## 2023-03-23 RX ADMIN — FENTANYL CITRATE 25 MCG: 50 INJECTION, SOLUTION INTRAMUSCULAR; INTRAVENOUS at 10:42

## 2023-03-23 RX ADMIN — FENTANYL CITRATE 25 MCG: 50 INJECTION, SOLUTION INTRAMUSCULAR; INTRAVENOUS at 10:22

## 2023-03-23 RX ADMIN — Medication 10 MG: at 10:44

## 2023-03-23 RX ADMIN — PROPOFOL 150 MG: 10 INJECTION, EMULSION INTRAVENOUS at 10:22

## 2023-03-23 RX ADMIN — KETOROLAC TROMETHAMINE 30 MG: 30 INJECTION, SOLUTION INTRAMUSCULAR; INTRAVENOUS at 11:20

## 2023-03-23 RX ADMIN — FENTANYL CITRATE 25 MCG: 50 INJECTION, SOLUTION INTRAMUSCULAR; INTRAVENOUS at 11:05

## 2023-03-23 RX ADMIN — ONDANSETRON HYDROCHLORIDE 4 MG: 2 SOLUTION INTRAMUSCULAR; INTRAVENOUS at 10:30

## 2023-03-23 RX ADMIN — SODIUM CHLORIDE, POTASSIUM CHLORIDE, SODIUM LACTATE AND CALCIUM CHLORIDE 75 ML/HR: 600; 310; 30; 20 INJECTION, SOLUTION INTRAVENOUS at 08:06

## 2023-03-23 RX ADMIN — LIDOCAINE HYDROCHLORIDE 80 MG: 20 INJECTION, SOLUTION EPIDURAL; INFILTRATION; INTRACAUDAL; PERINEURAL at 10:24

## 2023-03-23 RX ADMIN — MIDAZOLAM HYDROCHLORIDE 2 MG: 1 INJECTION, SOLUTION INTRAMUSCULAR; INTRAVENOUS at 10:17

## 2023-03-23 RX ADMIN — DEXAMETHASONE SODIUM PHOSPHATE 4 MG: 4 INJECTION, SOLUTION INTRAMUSCULAR; INTRAVENOUS at 10:24

## 2023-03-23 RX ADMIN — FENTANYL CITRATE 25 MCG: 50 INJECTION, SOLUTION INTRAMUSCULAR; INTRAVENOUS at 11:03

## 2023-03-23 NOTE — ANESTHESIA POSTPROCEDURE EVALUATION
Procedure(s):  TENSION FREE VAGINAL TAPING OBTURATOR/HYSTEROSCOPY DILATION AND CURETTAGE, ENDOMETRIAL ABLATION, KIMMIE. general    Anesthesia Post Evaluation      Multimodal analgesia: multimodal analgesia used between 6 hours prior to anesthesia start to PACU discharge  Patient location during evaluation: bedside  Patient participation: complete - patient participated  Level of consciousness: awake  Pain management: adequate  Airway patency: patent  Anesthetic complications: no  Cardiovascular status: acceptable  Respiratory status: acceptable  Hydration status: acceptable        INITIAL Post-op Vital signs:   Vitals Value Taken Time   BP 94/48 03/23/23 1242   Temp 36.6 °C (97.9 °F) 03/23/23 1135   Pulse 79 03/23/23 1242   Resp 18 03/23/23 1242   SpO2 99 % 03/23/23 1242   Vitals shown include unvalidated device data.

## 2023-03-23 NOTE — ANESTHESIA PREPROCEDURE EVALUATION
Relevant Problems   No relevant active problems       Anesthetic History   No history of anesthetic complications            Review of Systems / Medical History  Patient summary reviewed, nursing notes reviewed and pertinent labs reviewed    Pulmonary            Asthma : well controlled       Neuro/Psych   Within defined limits           Cardiovascular  Within defined limits                Exercise tolerance: >4 METS     GI/Hepatic/Renal               Comments: Urinary incontinence Endo/Other        Obesity     Other Findings   Comments: Crampings = moderate to severe           Physical Exam    Airway  Mallampati: III    Neck ROM: normal range of motion   Mouth opening: Diminished (comment)     Cardiovascular    Rhythm: regular  Rate: normal         Dental  No notable dental hx       Pulmonary  Breath sounds clear to auscultation               Abdominal         Other Findings            Anesthetic Plan    ASA: 2  Anesthesia type: general          Induction: Intravenous  Anesthetic plan and risks discussed with: Patient      Informed consent obtained.

## 2023-03-23 NOTE — PERIOP NOTES
Attending Physician Attestation Note:    Resident Physician: Pavel Rapp DO    Pt is a 41 year old male here for the following concerns:    Acute back pain, nontraumatic, no red flag symptoms   H/O T5 compression fracture, remote  Impression and Plan:  Soft tissue     I agree with the history, exam and plan as noted by the resident physician.    Please see resident note for details. Patient seen and examined with  DO Maryana Smith MD  2/22/2018                 Fluid Deficit:  20ml

## 2023-03-23 NOTE — OP NOTES
[address]    OPERATIVE REPORT  Hysteroscopy/D+C/Dot endometrial ablation/TVT-O/cystoscopy      NAME: Janette Reyes    AGE: 36 y.o. YOB: 1980    MEDICAL RECORD NUMBER: [id number]    DATE OF SURGERY: 3/23/2023    PREOPERATIVE DIAGNOSIS: Metromenonorrhagia, Stress urinary incontinence    POSTOPERATIVE DIAGNOSIS: Metromenonorrhagia, Stress urinary incontinence    OPERATIVE PROCEDURE:   1. Hysteroscopy  2. Dilitation and curettage  3. Dot endometrial ablation   4. TVT-O  5. Cystoscopy      SURGEON: Niki Grace III, MD    Assistant:  staff    ANESTHESIA: General    COMPLICATIONS: None     Implants:  Obtryx TVT-O mesh    ESTIMATED BLOOD LOSS: negligible    INDICATION FOR PROCEDURE: This patient has a history of excessive bleeding. She presents for hysteroscopy and D&C and Dot endometrial ablation with TVT-O and cystoscopy. Informed consent was obtained and the patient stated she has no other questions. PROCEDURE: The patient was prepped and draped in the dorsal lithotomy position after institution of general anesthesia. The cervix was normal, was grasped with two allis clamps, and was easily sounded to 8 cm. The endocervix was measured to be 3.5 cm. The cervix was dilated and then the hysteroscope was introduced. The endometrial cavity was inspected. There was no abnormality seen. The scope was removed. The endometrial cavity was curetted firmly and thoroughly in all quadrants for a small amount of tissue. The scope was reintroduced and all the quadrants were clear. The Dot device was set at 4.5 cm depth and inserted into the uterine cavity. The width achieved was adequate for activation. Cavity integrity passed. The device was activated. Treatment cycle was 120 seconds. There were no problems. The device was removed from the endometrial cavity and the scope re-inserted. All quadrants were fully treated. The scope was removed.    Attention was turned to the TVT. The previously placed arcos was used to identify the mid-urethra. Two allis clamps were placed. A 2 cm vertical incision was made, and dissection accomplished out to the obturator membrane. It was not punctured. With a finger in the left dissected space, the helical needle was passed from out to in, meeting the fingertip, which then guided the tip to the midline. The TVT-O device was mounted on the needle tip and then the needle was withdrawn, pulling one end of the device back through the skin. The same process was accomplished on the other side. The urethra was elevated to its normal position and then the slack taken out of the Obtryx device. It was then held in place with a lyndsey clamp and then removing the sleeves and the sutures on each side. After assuring the slack was removed but there was no tension on the urethra, the midline marker was removed by cutting the single retention suture. The excess mesh at the inner thigh skin was excised. The vaginal epithelium was closed with a running mattress suture of 2-0 Vicryl. The Arcos catheter was removed, the bladder was inspected with cystoscopy, and all surfaces appeared to be completely normal. There was prompt spill from both ureteral orifices. There were no signs of injury to the bladder. The scope was removed and the Arcos catheter replaced. The procedure was terminated. Total blood loss was negligible. The patient was awakened and taken to the recovery room in good condition.      Signed By:  Michelle Arevalo MD     March 23, 2023

## 2023-03-23 NOTE — H&P
Gynecology History and Physical    Name: Vikki Esparza MRN: [id number] SSN: [id number]    YOB: 1980  Age: 36 y.o. Sex: female       Subjective:      Chief complaint: excessive vaginal bleeding with menses and  Esperanza Delcid is a 36 y.o.  female with a history of excessive bleeding. Previous evaluation has been done with pap smear and pelvic exam, which revealed no apparent cause for her excessive bleeding. Had H/S, D+C with myosure last fall. Had benign endometrial lesion removed. Did not improve her symptoms. Also c/o USI. Urodynamics confirmed that. Pt wants to proceed with TVT-O.  300 cc bladder. Previous treatment has consisted of NSAIDS. She is now admitted for outpatient surgery consisting of Procedure(s) (LRB):  TENSION FREE VAGINAL TAPING OBTURATOR/HYSTEROSCOPY DILATION AND CURETTAGE, ENDOMETRIAL ABLATION, KIMMIE (N/A)  . (N/A)  . (N/A). The current method of family planning is tubal ligation. OB History          3    Para   3    Term   3            AB        Living   3         SAB        IAB        Ectopic        Molar        Multiple        Live Births   3              Past Medical History:   Diagnosis Date    Anemia     Constipation     Endometrial polyp 10/10/2022    benign    History of tuberculosis     Rx with pills/cleared  (last CXR 2019 negative)    Routine Papanicolaou smear 2023    normal HPV neg    Screening mammogram for breast cancer 2023    BIRADS 1: Negative.  No mammographic evidence of malignancy     Past Surgical History:   Procedure Laterality Date    HX CARPAL TUNNEL RELEASE Right 2016    HX REFRACTIVE SURGERY Bilateral     age 21y    HX TUBAL LIGATION  2008    HI HYSTEROSCOPY BX ENDOMETRIUM&/POLYPC W/WO D&C       Social History     Occupational History    Not on file   Tobacco Use    Smoking status: Never    Smokeless tobacco: Never   Vaping Use    Vaping Use: Never used   Substance and Sexual Activity    Alcohol use: Yes     Comment: about 1 a month    Drug use: Never    Sexual activity: Yes     Partners: Male     Birth control/protection: Surgical     Comment: tubal ligation     Family History   Problem Relation Age of Onset    Hypertension Mother     Heart Disease Father     Heart Disease Sister         No Known Allergies  Prior to Admission medications    Medication Sig Start Date End Date Taking? Authorizing Provider   polyethylene glycol (MIRALAX) 17 gram/dose powder Use as directed 12/6/22  Yes Alejo Felix, NP   triamcinolone acetonide (KENALOG) 0.025 % ointment Apply  to affected area two (2) times a day. use thin layer 10/5/22  Yes Alejo Felix, NP   ZINC PO Take 30 mg by mouth daily. Yes Provider, Historical   cetirizine HCl (CETIRIZINE PO) Take  by mouth daily. Yes Provider, Historical   cholecalciferol, vitamin D3, (VITAMIN D3 PO) Take 5,000 Units by mouth daily. Yes Provider, Historical   ibuprofen (MOTRIN) 800 mg tablet Take 800 mg by mouth every eight (8) hours as needed. 12/6/21   Provider, Historical   MAGNESIUM PO Take 500 mg by mouth daily. Provider, Historical   diphenhydramine HCl (BENADRYL PO) Take  by mouth as needed. Provider, Historical   acetaminophen (TYLENOL) 500 mg tablet Take 2 Tablets by mouth every six (6) hours as needed for Pain. 12/6/21   Kanchan Condon PA-C        Review of Systems:  A comprehensive review of systems was negative except for that written in the History of Present Illness.        Objective:     Vitals:    03/23/23 0756   BP: 111/76   Pulse: 67   Resp: 15   Temp: 98 °F (36.7 °C)   SpO2: 98%   Weight: 164 lb 7.4 oz (74.6 kg)   Height: 5' 4\" (1.626 m)       Physical Exam:  Heart: Regular rate and rhythm  Lung: clear to auscultation throughout lung fields, no wheezes, no rales, no rhonchi, and normal respiratory effort  Abdomen: soft, nontender  External Genitalia: normal general appearance  Urinary system: urethral meatus normal  Vagina: normal mucosa without prolapse or lesions  Cervix: normal appearance  Adnexa: normal bimanual exam  Uterus: normal single, nontender    Assessment:   Menorrhagia. Pure USI. Bladder capacity 300 cc. Plan:   TVT-O and Dot endometrial ablation. IC obtained and pt states NFQ. Procedure(s) (LRB):  TENSION FREE VAGINAL TAPING OBTURATOR/HYSTEROSCOPY DILATION AND CURETTAGE, ENDOMETRIAL ABLATION, DOT (N/A)  . (N/A)  . (N/A)  Discussed the risks of surgery including the risks of bleeding, infection, deep vein thrombosis, and surgical injuries to internal organs including but not limited to the bowels, bladder, rectum, and female reproductive organs. The patient understands the risks; any and all questions were answered to the patient's satisfaction.     Signed By:  Ana Pratt MD     March 23, 2023

## 2023-03-23 NOTE — PERIOP NOTES
Pt had failed the first voiding trial at approx 1330.  2 attempts made to replace arcos per pre op RN's.  1 hour after successful placement pt successfully passed voiding trial. Discharge directions given to  with understanding verbalized, and pt discharged.

## 2023-03-23 NOTE — DISCHARGE INSTRUCTIONS
Dilation and Curettage (D&C) with Dot ablation: What to Expect at [facility] 88 Recovery  Dilation and curettage (D&C) with ablation is a procedure to remove tissue from the inside of the uterus and reduce or eliminate menstrual flow. The doctor used instruments to gently scrape tissue from your uterus. The lining of the uterus was then treated to reduce and hopefully stop bleeding during periods. You may have a backache, or cramps similar to menstrual cramps, and pass small clots of blood from your vagina for the first few days. You may continue to have light vaginal bleeding for several weeks after the procedure. Some patients have significant cramping for up to 24 hours. A prescription for pain medication will be provided. You will probably be able to go back to most of your normal activities in 1 or 2 days. This care sheet gives you a general idea about how long it will take for you to recover. But each person recovers at a different pace. Follow the steps below to get better as quickly as possible. How can you care for yourself at home? Activity  Rest when you feel tired. Getting enough sleep will help you recover. You may resume strenuous activities, such as bicycle riding, jogging, weight lifting, or aerobic exercise, when you feel able to do so. Most women are able to return to work the day after the procedure. You may have some light vaginal bleeding. Wear sanitary pads if needed. Do not douche or use tampons for 2 weeks or until your doctor says it is okay. Ask your doctor when it is okay for you to have sex--usually after bleeding and discharge have stopped. Use condoms to prevent pregnancy if necessary. Diet  You can eat your normal diet. If your stomach is upset, try bland, low-fat foods like plain rice, broiled chicken, toast, and yogurt. Drink fluids as you would normally. Medicines  Take pain medicines exactly as directed.   If the doctor gave you a prescription medicine for pain, take it as prescribed. If you are not taking a prescription pain medicine, take Aleve or Advil as needed unless you are allergic. If you think your pain medicine is making you sick to your stomach: Take your medicine with food. Ask your doctor for a different pain medicine. If your doctor prescribed antibiotics, take them as directed. Do not stop taking them just because you feel better. Follow-up appointment is usually 2 or 3 weeks after surgery. Be sure to make an appointment if you don't have one already, and call your doctor if you are having problems. It's also a good idea to know your test results and keep a list of the medicines you take. When should you call for help? Call 911 anytime you think you may need emergency care. For example, call if:  You passed out (lost consciousness). You have severe trouble breathing. You have chest pain and shortness of breath, or you cough up blood. You have severe pain in your belly. Call your doctor now or seek immediate medical care if:  You have bright red vaginal bleeding that soaks one or more pads each hour for 2 or more hours. You pass blood clots that are larger than a golf ball. You have vaginal discharge that smells bad. You are sick to your stomach or cannot keep fluids down. You have pain that does not get better after you take pain medicine. You have pain that is getting worse 2 days after the procedure. You have a fever over 100°F.  Your belly feels tender, or full and hard. TVT  Tension free Vaginal Tape  Procedure-specific postoperative instructions    General Instructions   Make an appointment to come back to the office in about 2-3 weeks. Eat and drink your normal diet. Take laxatives as needed. Call us if you have questions or problems. Bladder care   Your bladder may be irritable following this surgery, so you may need to go more frequently or urgently than before.   Please be patient, this will usually go away within the first six weeks without treatment. It is possible, however to get a bladder infection after surgery. If it is burning as you urinate, symptoms are getting worse, or you see blood in your urine consistently, call us. Otherwise, it is important that you empty your bladder when you go. You may need to take extra time or even press gently above your pubic bone as you urinate to help your bladder empty. What to expect   You have had minor surgery through a tiny incision in the front of the vagina. The most important factor after this surgery to anchor the bladder neck back in place is to not tear it loose. That means no significant lifting for 6 weeks. After that healing is complete and you can resume normal activity. The chances of undoing the surgery are very small, but not zero. You can expect some pulling or sharp sensations. It is not likely to be severe and other than lifting heavy objects or falling you will not damage this repair doing daily activities. As always, if you feel like something is amiss, call the office. DISCHARGE SUMMARY from your Nurse      PATIENT INSTRUCTIONS    After general anesthesia or intravenous sedation, for 24 hours or while taking prescription Narcotics:  Limit your activities  Do not drive and operate hazardous machinery  Do not make important personal or business decisions  Do  not drink alcoholic beverages  If you have not urinated within 8 hours after discharge, please contact your surgeon on call.     Report the following to your surgeon:  Excessive pain, swelling, redness or odor of or around the surgical area  Temperature over 100.5  Nausea and vomiting lasting longer than 4 hours or if unable to take medications  Any signs of decreased circulation or nerve impairment to extremity: change in color, persistent  numbness, tingling, coldness or increase pain  Any questions      GOOD HELP TO FIGHT AN INFECTION  Here are a few tip to help reduce the chance of getting an infection after surgery:  Wash Your Hands  Good handwashing is the most important thing you and your caregiver can do. Wash before and after caring for any wounds. Dry your hand with a clean towel. Wash with soap and water for at least 20 seconds. A TIP: sing the \"Happy Birthday\" song through one time while washing to help with the timing. Use a hand  in between washings. Shower  When your surgeon says it is OK to take a shower, use a new bar of antibacterial soap (if that is what you use, and keep that bar of soap ONLY for your use), or antibacterial body wash. Use a clean wash cloth or sponge when you bathe. Dry off with a clean towel  after every bath - be careful around any wounds, skin staples, sutures or surgical glue over/on wounds. Do not enter swimming pools, hot tubs, lakes, rivers and/or ocean until wounds are healed and your doctor/surgeon says it is OK. Use Clean Sheets  Sleep on freshly laundered sheets after your surgery. Keep the surgery site covered with a clean, dry bandage (if instructed to do so). If the bandage becomes soiled, reapply a new, dry, clean bandage. Do not allow pets to sleep with you while your wound is healing. Lifestyle Modification and Controlling Your Blood Sugar  Smoking slows wound healing. Stop smoking and limit exposure to second-hand smoke. High blood sugar slows wound healing. Eat a well-balanced diet to provide proper nutrition while healing  Monitor your blood sugar (if you are a diabetic) and take your medications as you are suppose to so you can control you blood sugar after surgery. COUGH AND DEEP BREATHE    Breathing deeply and coughing are very important exercises to do after surgery. Deep breathing and coughing open the little air tubes and air sacks in your lungs. You take deep breaths every day. You may not even notice - it is just something you do when you sigh or yawn.   It is a natural exercise you do to keep these air passages open. After surgery, take deep breaths and cough, on purpose. DIRECTIONS:  Take 10 to 15 slow deep breaths every hour while awake. Breathe in deeply, and hold it for 2 seconds. Exhale slowly through puckered lips, like blowing up a balloon. After every 4th or 5th deep breath, hug your pillow to your chest or belly and give a hard, deep cough. Yes, it will probably hurt. But doing this exercise is a very important part of healing after surgery. Take your pain medicine to help you do this exercise without too much pain. Coughing and deep breathing help prevent bronchitis and pneumonia after surgery. If you had chest or belly surgery, use a pillow as a \"hug shelley\" and hold it tightly to your chest or belly when you cough. ANKLE PUMPS    Ankle pumps increase the circulation of oxygenated blood to your lower extremities and decrease your risk for circulation problems such as blood clots. They also stretch the muscles, tendons and ligaments in your foot and ankle, and prevent joint contracture in the ankle and foot, especially after surgeries on the legs. It is important to do ankle pump exercises regularly after surgery because immobility increases your risk for developing a blood clot. Your doctor may also have you take an Aspirin for the next few days as well. If your doctor did not ask you to take an Aspirin, consult with him before starting Aspirin therapy on your own. The exercise is quite simple. Slowly point your foot forward, feeling the muscles on the top of your lower leg stretch, and hold this position for 5 seconds. Next, pull your foot back toward you as far as possible, stretching the calf muscles, and hold that position for 5 seconds. Repeat with the other foot.   Perform 10 repetitions every hour while awake for both ankles if possible (down and then up with the foot once is one repetition). You should feel gentle stretching of the muscles in your lower leg when doing this exercise. If you feel pain, or your range of motion is limited, don't push too hard. Only go the limit your joint and muscles will let you go. If you have increasing pain, progressively worsening leg warmth or swelling, STOP the exercise and call your doctor. MEDICATION AND   SIDE EFFECT GUIDE    The [facility] MEDICATION AND SIDE EFFECT GUIDE was provided to the PATIENT AND CARE PROVIDER. Information provided includes instruction about drug purpose and common side effects for the following medications:   Perocet          These are general instructions for a healthy lifestyle:    *   Please give a list of your current medications to your Primary Care Provider. *   Please update this list whenever your medications are discontinued, doses are changed, or new medications (including over-the-counter products) are added. *   Please carry medication information at all times in case of emergency situations. About Smoking  No smoking / No tobacco products  Avoid exposure to second hand smoke     Surgeon General's Warning:  Quitting smoking now greatly reduces serious risk to your health. Obesity, smoking, and sedentary lifestyle greatly increases your risk for illness and disease. A healthy diet, regular physical exercise & weight monitoring are important for maintaining a healthy lifestyle. Congestive Heart Failure  You may be retaining fluid if you have a history of heart failure or if you experience any of the following symptoms:  Weight gain of 3 pounds or more overnight or 5 pounds in a week, increased swelling in your hands or feet or shortness of breath while lying flat in bed. Please call your doctor as soon as you notice any of these symptoms; do not wait until your next office visit.       Recognize signs and symptoms of STROKE:  F -  Face looks uneven  A -  Arms unable to move or move evenly  S -  Speech slurred or non-existent  T -  Time-call 911 as soon as signs and symptoms begin-DO NOT go          back to bed or wait to see if you get better-TIME IS BRAIN. Warning Signs of HEART ATTACK   Call 911 if you have these symptoms:    Chest discomfort. Most heart attacks involve discomfort in the center of the chest that lasts more than a few minutes, or that goes away and comes back. It can feel like uncomfortable pressure, squeezing, fullness, or pain. Discomfort in other areas of the upper body. Symptoms can include pain or discomfort in one or both arms, the back, neck, jaw, or stomach. Shortness of breath with or without chest discomfort. Other signs may include breaking out in a cold sweat, nausea, or lightheadedness. Don't wait more than five minutes to call 911 - MINUTES MATTER! Fast action can save your life. Calling 911 is almost always the fastest way to get lifesaving treatment. Emergency Medical Services staff can begin treatment when they arrive -- up to an hour sooner than if someone gets to the hospital by car. Learning About Coronavirus [phone])  Coronavirus [phone]): Overview  What is coronavirus (COVID-19)? The coronavirus disease (COVID-19) is caused by a virus. It is an illness that was first found in [place], [place], in December 2019. It has since spread worldwide. The virus can cause fever, cough, and trouble breathing. In severe cases, it can cause pneumonia and make it hard to breathe without help. It can cause death. Coronaviruses are a large group of viruses. They cause the common cold. They also cause more serious illnesses like Middle East respiratory syndrome (MERS) and severe acute respiratory syndrome (SARS). COVID-19 is caused by a novel coronavirus. That means it's a new type that has not been seen in people before. This virus spreads person-to-person through droplets from coughing and sneezing.  It can also spread when you are close to someone who is infected. And it can spread when you touch something that has the virus on it, such as a doorknob or a tabletop. What can you do to protect yourself from coronavirus (COVID-19)? The best way to protect yourself from getting sick is to: Avoid areas where there is an outbreak. Avoid contact with people who may be infected. Wash your hands often with soap or alcohol-based hand sanitizers. Avoid crowds and try to stay at least 6 feet away from other people. Wash your hands often, especially after you cough or sneeze. Use soap and water, and scrub for at least 20 seconds. If soap and water aren't available, use an alcohol-based hand . Avoid touching your mouth, nose, and eyes. What can you do to avoid spreading the virus to others? To help avoid spreading the virus to others:  Cover your mouth with a tissue when you cough or sneeze. Then throw the tissue in the trash. Use a disinfectant to clean things that you touch often. Stay home if you are sick or have been exposed to the virus. Don't go to school, work, or public areas. And don't use public transportation. If you are sick:  Leave your home only if you need to get medical care. But call the doctor's office first so they know you're coming. And wear a face mask, if you have one. If you have a face mask, wear it whenever you're around other people. It can help stop the spread of the virus when you cough or sneeze. Clean and disinfect your home every day. Use household  and disinfectant wipes or sprays. Take special care to clean things that you grab with your hands. These include doorknobs, remote controls, phones, and handles on your refrigerator and microwave. And don't forget countertops, tabletops, bathrooms, and computer keyboards. When to call for help  Call 911 anytime you think you may need emergency care. For example, call if:  You have severe trouble breathing.  (You can't talk at all.)  You have constant chest pain or pressure. You are severely dizzy or lightheaded. You are confused or can't think clearly. Your face and lips have a blue color. You pass out (lose consciousness) or are very hard to wake up. Call your doctor now if you develop symptoms such as:  Shortness of breath. Fever. Cough. If you need to get care, call ahead to the doctor's office for instructions before you go. Make sure you wear a face mask, if you have one, to prevent exposing other people to the virus. Where can you get the latest information? The following health organizations are tracking and studying this virus. Their websites contain the most up-to-date information. Trini Canseco also learn what to do if you think you may have been exposed to the virus. U.S. Centers for Disease Control and Prevention (CDC): The CDC provides updated news about the disease and travel advice. The website also tells you how to prevent the spread of infection. www.cdc.gov  World Health Organization [facility]): WHO offers information about the virus outbreaks. WHO also has travel advice. www.who.int  Current as of: April 1, 2020               Content Version: 12.4  © 5694-3784 [employer]. Care instructions adapted under license by your healthcare professional. If you have questions about a medical condition or this instruction, always ask your healthcare professional. [address] any warranty or liability for your use of this information. The discharge information has been reviewed with the spouse. Any questions and concerns from the spouse have been addressed. The spouse verbalized understanding.         CONTENTS FOUND IN YOUR DISCHARGE ENVELOPE:  [x]     Surgeon and Hospital Discharge Instructions  [x]     [facility] Care Provider Card  []     Medication & Side Effect Guide            (your newly prescribed medications have been marked/highlighted showing the most common side effects from   the classes of drugs on your prescriptions)  []     Medication Prescription(s) x *** ( [] These have been sent electronically to your pharmacy by your surgeon,   - OR -       your surgeon has already provided these to you during a previous/pre-op office visit)  []     [address]  []     Physical Therapy Prescription  []     Follow-up Appointment Cards  []     Surgery-related Pictures/Media  []     Pain block and/or block with On-Q Catheter from Anesthesia Service (information included in your instructions above)  []     Medical device information sheets/pamphlets from their    []     School/work excuse note. []     /parent work excuse note. The following personal items collected during your admission are returned to you:   Dental Appliance: Dental Appliances: None  Vision: Visual Aid: None  Hearing Aid:    Jewelry: Jewelry: None  Clothing: Clothing: Other (comment) (pt clothing placed in pacu locker)  Other Valuables:  Other Valuables: Cell Phone, Other (comment) (pt cellphone on pacu desk)  Valuables sent to safe:

## 2023-03-24 ENCOUNTER — TELEPHONE (OUTPATIENT)
Dept: OBGYN CLINIC | Age: 43
End: 2023-03-24

## 2023-03-24 NOTE — TELEPHONE ENCOUNTER
Lorraine Abarca MD  to Me    HW    1:39 PM  Yes       Patient advised of MD recommendations and verbalized understanding    Letter composed as per Md order and sent to patient via her my chart

## 2023-03-24 NOTE — TELEPHONE ENCOUNTER
Two patient identifiers used        36year old patient  had surgery on 3/23/2023             OPERATIVE PROCEDURE:   1. Hysteroscopy  2. Dilitation and curettage  3. Dot endometrial ablation   4. TVT-O  5. Cystoscopy   f      Patient calling to say that she has to go back to work on Sunday     Patient is requesting  letter for work stating no heavy lifting, ,pushing, or pulling of anything greater than 25 pounds for six weeks      Patient states she works at [employer]    ?  Ok to write the letter

## 2023-03-24 NOTE — LETTER
NOTIFICATION RETURN TO WORK / SCHOOL    3/24/2023 2:27 PM    Ms. Nunu Pierson  [address] [facility] [address]      To Whom It May Concern:    Nunu Pierson is currently under the care of [address] [place] [address]. She will return to work/school on:3/26/2023 and has the following restriction.     Patient may not push , pull or lift any thing greater than 25 pounds for six weeks ending on 5/7/2023      If there are questions or concerns please have the patient contact our office at [phone]  Thank you        Sincerely,      Robert Gutierrez MD

## 2023-04-06 NOTE — PROGRESS NOTES
Daniel House is a 36 y.o. female presents for a problem visit. Chief Complaint   Patient presents with    Post OP Follow Up     Patient's last menstrual period was 01/18/2023 (approximate). Birth Control: none. Last Pap: normal obtained 3 months ago. The patient is reporting having: pt still have pain post surgery and still urine leakage issues . She reports the symptoms are is unchanged. Aggravating factors include none. And alleviating factors include none. 1. Have you been to the ER, urgent care clinic, or hospitalized since your last visit? No    2. Have you seen or consulted any other health care providers outside of the [address] [place] [address] since your last visit? No    Examination chaperoned by Parminder Scherer LPN.

## 2023-04-07 ENCOUNTER — OFFICE VISIT (OUTPATIENT)
Dept: OBGYN CLINIC | Age: 43
End: 2023-04-07
Payer: COMMERCIAL

## 2023-04-07 PROCEDURE — 99024 POSTOP FOLLOW-UP VISIT: CPT | Performed by: OBSTETRICS & GYNECOLOGY

## 2023-11-26 ENCOUNTER — HOSPITAL ENCOUNTER (EMERGENCY)
Facility: HOSPITAL | Age: 43
Discharge: HOME | End: 2023-11-26
Attending: EMERGENCY MEDICINE
Payer: COMMERCIAL

## 2023-11-26 ENCOUNTER — APPOINTMENT (OUTPATIENT)
Facility: HOSPITAL | Age: 43
End: 2023-11-26
Payer: COMMERCIAL

## 2023-11-26 VITALS
TEMPERATURE: 97.3 F | DIASTOLIC BLOOD PRESSURE: 83 MMHG | HEART RATE: 72 BPM | BODY MASS INDEX: 29.33 KG/M2 | SYSTOLIC BLOOD PRESSURE: 111 MMHG | OXYGEN SATURATION: 100 % | RESPIRATION RATE: 16 BRPM | WEIGHT: 170.86 LBS

## 2023-11-26 DIAGNOSIS — K52.9 COLITIS: ICD-10-CM

## 2023-11-26 DIAGNOSIS — R10.9 ABDOMINAL PAIN, UNSPECIFIED ABDOMINAL LOCATION: Primary | ICD-10-CM

## 2023-11-26 DIAGNOSIS — R31.9 HEMATURIA, UNSPECIFIED TYPE: ICD-10-CM

## 2023-11-26 LAB
ALBUMIN SERPL-MCNC: 4 G/DL (ref 3.5–5)
ALBUMIN/GLOB SERPL: 1 (ref 1.1–2.2)
ALP SERPL-CCNC: 72 U/L (ref 45–117)
ALT SERPL-CCNC: 37 U/L (ref 12–78)
ANION GAP SERPL CALC-SCNC: 12 MMOL/L (ref 5–15)
APPEARANCE UR: CLEAR
AST SERPL-CCNC: 23 U/L (ref 15–37)
BACTERIA URNS QL MICRO: NEGATIVE /HPF
BASOPHILS # BLD: 0.1 K/UL (ref 0–0.1)
BASOPHILS NFR BLD: 1 % (ref 0–1)
BILIRUB SERPL-MCNC: 0.4 MG/DL (ref 0.2–1)
BILIRUB UR QL: NEGATIVE
BUN SERPL-MCNC: 8 MG/DL (ref 6–20)
BUN/CREAT SERPL: 11 (ref 12–20)
CALCIUM SERPL-MCNC: 9.2 MG/DL (ref 8.5–10.1)
CHLORIDE SERPL-SCNC: 105 MMOL/L (ref 97–108)
CO2 SERPL-SCNC: 25 MMOL/L (ref 21–32)
COLOR UR: NORMAL
CREAT SERPL-MCNC: 0.72 MG/DL (ref 0.55–1.02)
DIFFERENTIAL METHOD BLD: NORMAL
EOSINOPHIL # BLD: 0.2 K/UL (ref 0–0.4)
EOSINOPHIL NFR BLD: 3 % (ref 0–7)
EPITH CASTS URNS QL MICRO: NORMAL /LPF
ERYTHROCYTE [DISTWIDTH] IN BLOOD BY AUTOMATED COUNT: 14.2 % (ref 11.5–14.5)
GLOBULIN SER CALC-MCNC: 3.9 G/DL (ref 2–4)
GLUCOSE SERPL-MCNC: 73 MG/DL (ref 65–100)
GLUCOSE UR STRIP.AUTO-MCNC: NEGATIVE MG/DL
HCG UR QL: NEGATIVE
HCT VFR BLD AUTO: 43.8 % (ref 35–47)
HGB BLD-MCNC: 14.5 G/DL (ref 11.5–16)
HGB UR QL STRIP: NEGATIVE
IMM GRANULOCYTES # BLD AUTO: 0 K/UL (ref 0–0.04)
IMM GRANULOCYTES NFR BLD AUTO: 0 % (ref 0–0.5)
KETONES UR QL STRIP.AUTO: NEGATIVE MG/DL
LEUKOCYTE ESTERASE UR QL STRIP.AUTO: NEGATIVE
LYMPHOCYTES # BLD: 2.8 K/UL (ref 0.8–3.5)
LYMPHOCYTES NFR BLD: 35 % (ref 12–49)
MCH RBC QN AUTO: 29.5 PG (ref 26–34)
MCHC RBC AUTO-ENTMCNC: 33.1 G/DL (ref 30–36.5)
MCV RBC AUTO: 89 FL (ref 80–99)
MONOCYTES # BLD: 0.6 K/UL (ref 0–1)
MONOCYTES NFR BLD: 7 % (ref 5–13)
NEUTS SEG # BLD: 4.3 K/UL (ref 1.8–8)
NEUTS SEG NFR BLD: 54 % (ref 32–75)
NITRITE UR QL STRIP.AUTO: NEGATIVE
NRBC # BLD: 0 K/UL (ref 0–0.01)
NRBC BLD-RTO: 0 PER 100 WBC
PH UR STRIP: 5.5 (ref 5–8)
PLATELET # BLD AUTO: 334 K/UL (ref 150–400)
PMV BLD AUTO: 10.2 FL (ref 8.9–12.9)
POTASSIUM SERPL-SCNC: 3.7 MMOL/L (ref 3.5–5.1)
PROT SERPL-MCNC: 7.9 G/DL (ref 6.4–8.2)
PROT UR STRIP-MCNC: NEGATIVE MG/DL
RBC # BLD AUTO: 4.92 M/UL (ref 3.8–5.2)
RBC #/AREA URNS HPF: NORMAL /HPF (ref 0–5)
SODIUM SERPL-SCNC: 142 MMOL/L (ref 136–145)
SP GR UR REFRACTOMETRY: <1.005 (ref 1–1.03)
UROBILINOGEN UR QL STRIP.AUTO: 0.2 EU/DL (ref 0.2–1)
WBC # BLD AUTO: 7.9 K/UL (ref 3.6–11)
WBC URNS QL MICRO: NORMAL /HPF (ref 0–4)

## 2023-11-26 PROCEDURE — 85025 COMPLETE CBC W/AUTO DIFF WBC: CPT

## 2023-11-26 PROCEDURE — 6360000002 HC RX W HCPCS: Performed by: EMERGENCY MEDICINE

## 2023-11-26 PROCEDURE — 6360000004 HC RX CONTRAST MEDICATION: Performed by: EMERGENCY MEDICINE

## 2023-11-26 PROCEDURE — 74177 CT ABD & PELVIS W/CONTRAST: CPT

## 2023-11-26 PROCEDURE — 81025 URINE PREGNANCY TEST: CPT

## 2023-11-26 PROCEDURE — 36415 COLL VENOUS BLD VENIPUNCTURE: CPT

## 2023-11-26 PROCEDURE — 96374 THER/PROPH/DIAG INJ IV PUSH: CPT

## 2023-11-26 PROCEDURE — 81001 URINALYSIS AUTO W/SCOPE: CPT

## 2023-11-26 PROCEDURE — 99285 EMERGENCY DEPT VISIT HI MDM: CPT

## 2023-11-26 PROCEDURE — 2580000003 HC RX 258: Performed by: EMERGENCY MEDICINE

## 2023-11-26 PROCEDURE — 80053 COMPREHEN METABOLIC PANEL: CPT

## 2023-11-26 RX ORDER — CEPHALEXIN 500 MG/1
500 CAPSULE ORAL 2 TIMES DAILY
Qty: 14 CAPSULE | Refills: 0 | Status: SHIPPED | OUTPATIENT
Start: 2023-11-26 | End: 2023-12-03

## 2023-11-26 RX ORDER — PREDNISONE 20 MG/1
40 TABLET ORAL DAILY
Qty: 20 TABLET | Refills: 0 | Status: SHIPPED | OUTPATIENT
Start: 2023-11-26 | End: 2023-12-06

## 2023-11-26 RX ORDER — 0.9 % SODIUM CHLORIDE 0.9 %
1000 INTRAVENOUS SOLUTION INTRAVENOUS ONCE
Status: COMPLETED | OUTPATIENT
Start: 2023-11-26 | End: 2023-11-26

## 2023-11-26 RX ADMIN — METHYLPREDNISOLONE SODIUM SUCCINATE 125 MG: 125 INJECTION, POWDER, FOR SOLUTION INTRAMUSCULAR; INTRAVENOUS at 21:37

## 2023-11-26 RX ADMIN — IOPAMIDOL 100 ML: 755 INJECTION, SOLUTION INTRAVENOUS at 20:45

## 2023-11-26 RX ADMIN — SODIUM CHLORIDE 1000 ML: 9 INJECTION, SOLUTION INTRAVENOUS at 19:56

## 2023-11-26 ASSESSMENT — PAIN SCALES - GENERAL: PAINLEVEL_OUTOF10: 5

## 2023-11-26 ASSESSMENT — PAIN DESCRIPTION - PAIN TYPE: TYPE: ACUTE PAIN

## 2023-11-26 ASSESSMENT — PAIN DESCRIPTION - LOCATION: LOCATION: ABDOMEN

## 2023-11-26 ASSESSMENT — PAIN DESCRIPTION - ORIENTATION: ORIENTATION: RIGHT

## 2023-11-26 ASSESSMENT — PAIN DESCRIPTION - FREQUENCY: FREQUENCY: CONTINUOUS

## 2023-11-26 ASSESSMENT — PAIN DESCRIPTION - DESCRIPTORS: DESCRIPTORS: SHARP

## 2023-11-26 NOTE — ED TRIAGE NOTES
ED triage note: Ambulatory with a steady gait. Patient reports right lower quadrant abdominal pain, chills, and nausea since Wednesday. Denies diarrhea.

## 2023-11-26 NOTE — ED PROVIDER NOTES
[facility]  EMERGENCY DEPARTMENT ENCOUNTER      Pt Name: Ez King  MRN: [id number]  [address] 1980  Date of evaluation: 11/26/2023  Provider: Alpha Boast, [place]       Chief Complaint   Patient presents with    Abdominal Pain    Nausea         HISTORY OF PRESENT ILLNESS   (Location/Symptom, Timing/Onset, Context/Setting, Quality, Duration, Modifying Factors, Severity)  Note limiting factors. 70-year-old female presents emergency department chief complaint of gradual onset of right lower quadrant pain. Patient states that she has had this before but resolved on its own. She states that she has increased abdominal pain at the end of her urinary stream but does not actually hurt when she urinates. Denies chance of pregnancy. Reports mildly poor appetite          Review of External Medical Records:     Nursing Notes were reviewed. REVIEW OF SYSTEMS    (2-9 systems for level 4, 10 or more for level 5)     Review of Systems    Except as noted above the remainder of the review of systems was reviewed and negative. PAST MEDICAL HISTORY     Past Medical History:   Diagnosis Date    Anemia     Constipation     Endometrial polyp 10/10/2022    benign    History of tuberculosis 1993    Rx with pills/cleared  (last CXR 2019 negative)    Routine Papanicolaou smear 01/26/2023    normal HPV neg    Screening mammogram for breast cancer 01/26/2023    BIRADS 1: Negative.  No mammographic evidence of malignancy         SURGICAL HISTORY       Past Surgical History:   Procedure Laterality Date    CARPAL TUNNEL RELEASE Right 2016    HYSTEROSCOPY,W/ENDO BX  2022    REFRACTIVE SURGERY Bilateral     age 21y    TUBAL LIGATION  2008         CURRENT MEDICATIONS       Previous Medications    ACETAMINOPHEN (TYLENOL) 500 MG TABLET    Take 2 tablets by mouth every 6 hours as needed    IBUPROFEN (ADVIL;MOTRIN) 800 MG TABLET    Take 1 tablet by mouth every 8 hours as needed    MAGNESIUM PO    Take

## 2023-11-27 NOTE — ED NOTES
Patient given discharge instructions, scripts sent electronically.      Lt a/c iv removed     Jocy Espino RN  11/26/23 1319

## 2023-12-01 ENCOUNTER — TELEPHONE (OUTPATIENT)
Age: 43
End: 2023-12-01

## 2023-12-05 ENCOUNTER — OFFICE VISIT (OUTPATIENT)
Age: 43
End: 2023-12-05
Payer: COMMERCIAL

## 2023-12-05 VITALS
HEART RATE: 72 BPM | TEMPERATURE: 98.1 F | DIASTOLIC BLOOD PRESSURE: 87 MMHG | BODY MASS INDEX: 28.17 KG/M2 | WEIGHT: 165 LBS | OXYGEN SATURATION: 98 % | HEIGHT: 64 IN | SYSTOLIC BLOOD PRESSURE: 120 MMHG | RESPIRATION RATE: 18 BRPM

## 2023-12-05 DIAGNOSIS — M62.89 PELVIC FLOOR DYSFUNCTION: ICD-10-CM

## 2023-12-05 DIAGNOSIS — K59.09 CHRONIC CONSTIPATION: Primary | ICD-10-CM

## 2023-12-05 DIAGNOSIS — F32.1 CURRENT MODERATE EPISODE OF MAJOR DEPRESSIVE DISORDER WITHOUT PRIOR EPISODE (HCC): ICD-10-CM

## 2023-12-05 PROCEDURE — 99214 OFFICE O/P EST MOD 30 MIN: CPT | Performed by: NURSE PRACTITIONER

## 2023-12-05 RX ORDER — FLUOXETINE HYDROCHLORIDE 20 MG/1
20 CAPSULE ORAL DAILY
Qty: 90 CAPSULE | Refills: 0 | Status: SHIPPED | OUTPATIENT
Start: 2023-12-05

## 2023-12-05 RX ORDER — POLYETHYLENE GLYCOL 3350 17 G/17G
POWDER, FOR SOLUTION ORAL
Qty: 1530 G | Refills: 0 | Status: SHIPPED | OUTPATIENT
Start: 2023-12-05 | End: 2024-01-23

## 2023-12-05 SDOH — ECONOMIC STABILITY: HOUSING INSECURITY
IN THE LAST 12 MONTHS, WAS THERE A TIME WHEN YOU DID NOT HAVE A STEADY PLACE TO SLEEP OR SLEPT IN A SHELTER (INCLUDING NOW)?: NO

## 2023-12-05 SDOH — ECONOMIC STABILITY: INCOME INSECURITY: HOW HARD IS IT FOR YOU TO PAY FOR THE VERY BASICS LIKE FOOD, HOUSING, MEDICAL CARE, AND HEATING?: NOT HARD AT ALL

## 2023-12-05 SDOH — ECONOMIC STABILITY: FOOD INSECURITY: WITHIN THE PAST 12 MONTHS, THE FOOD YOU BOUGHT JUST DIDN'T LAST AND YOU DIDN'T HAVE MONEY TO GET MORE.: NEVER TRUE

## 2023-12-05 SDOH — ECONOMIC STABILITY: FOOD INSECURITY: WITHIN THE PAST 12 MONTHS, YOU WORRIED THAT YOUR FOOD WOULD RUN OUT BEFORE YOU GOT MONEY TO BUY MORE.: NEVER TRUE

## 2023-12-05 ASSESSMENT — PATIENT HEALTH QUESTIONNAIRE - PHQ9
5. POOR APPETITE OR OVEREATING: 1
8. MOVING OR SPEAKING SO SLOWLY THAT OTHER PEOPLE COULD HAVE NOTICED. OR THE OPPOSITE, BEING SO FIGETY OR RESTLESS THAT YOU HAVE BEEN MOVING AROUND A LOT MORE THAN USUAL: 0
2. FEELING DOWN, DEPRESSED OR HOPELESS: 3
SUM OF ALL RESPONSES TO PHQ QUESTIONS 1-9: 13
9. THOUGHTS THAT YOU WOULD BE BETTER OFF DEAD, OR OF HURTING YOURSELF: 1
10. IF YOU CHECKED OFF ANY PROBLEMS, HOW DIFFICULT HAVE THESE PROBLEMS MADE IT FOR YOU TO DO YOUR WORK, TAKE CARE OF THINGS AT HOME, OR GET ALONG WITH OTHER PEOPLE: 1
7. TROUBLE CONCENTRATING ON THINGS, SUCH AS READING THE NEWSPAPER OR WATCHING TELEVISION: 2
3. TROUBLE FALLING OR STAYING ASLEEP: 1
SUM OF ALL RESPONSES TO PHQ QUESTIONS 1-9: 14
4. FEELING TIRED OR HAVING LITTLE ENERGY: 1
6. FEELING BAD ABOUT YOURSELF - OR THAT YOU ARE A FAILURE OR HAVE LET YOURSELF OR YOUR FAMILY DOWN: 2
SUM OF ALL RESPONSES TO PHQ QUESTIONS 1-9: 14
SUM OF ALL RESPONSES TO PHQ QUESTIONS 1-9: 14
SUM OF ALL RESPONSES TO PHQ9 QUESTIONS 1 & 2: 6
1. LITTLE INTEREST OR PLEASURE IN DOING THINGS: 3

## 2023-12-05 ASSESSMENT — COLUMBIA-SUICIDE SEVERITY RATING SCALE - C-SSRS
2. HAVE YOU ACTUALLY HAD ANY THOUGHTS OF KILLING YOURSELF?: NO
6. HAVE YOU EVER DONE ANYTHING, STARTED TO DO ANYTHING, OR PREPARED TO DO ANYTHING TO END YOUR LIFE?: NO
7. DID THIS OCCUR IN THE LAST THREE MONTHS: NO
3. HAVE YOU BEEN THINKING ABOUT HOW YOU MIGHT KILL YOURSELF?: NO
5. HAVE YOU STARTED TO WORK OUT OR WORKED OUT THE DETAILS OF HOW TO KILL YOURSELF? DO YOU INTEND TO CARRY OUT THIS PLAN?: NO
4. HAVE YOU HAD THESE THOUGHTS AND HAD SOME INTENTION OF ACTING ON THEM?: NO
BASED ON RESPONSES TO C-SSRS QS 1-6, WHAT IS THE PATIENT'S OVERALL RISK RATING FOR SUICIDE: NO RISK
1. WITHIN THE PAST MONTH, HAVE YOU WISHED YOU WERE DEAD OR WISHED YOU COULD GO TO SLEEP AND NOT WAKE UP?: NO

## 2023-12-05 NOTE — PROGRESS NOTES
Chief Complaint   Patient presents with    Follow-up     Patient presents in office today for ED f/u from [facility] ED. Was seen on 11/26/23 for abdominal pain. No other concerns. 1. \"Have you been to the ER, urgent care clinic since your last visit? Hospitalized since your last visit? \" Yes When: 11/26/23 Where: [facility] ED Reason for visit: abdominal pain    2. \"Have you seen or consulted any other health care providers outside of the [address] [place] [address] since your last visit? \" No     3. For patients aged 43-73: Has the patient had a colonoscopy / FIT/ Cologuard? NA - based on age      If the patient is female:    4. For patients aged 43-66: Has the patient had a mammogram within the past 2 years? Yes - no Care Gap present      5. For patients aged 21-65: Has the patient had a pap smear?  Yes - no Care Gap present
by mouth daily for 10 days (Patient not taking: Reported on 12/5/2023)     No current facility-administered medications for this visit.      Physical Examination: General appearance - alert, well appearing, and in no distress and oriented to person, place, and time  Mental status - normal mood, behavior, speech, dress, motor activity, and thought processes  Eyes - sclera anicteric  Neck - supple, no significant adenopathy, thyroid exam: thyroid is normal in size without nodules or tenderness  Chest - clear to auscultation, no wheezes, rales or rhonchi, symmetric air entry  Heart - normal rate, regular rhythm, normal S1, S2, no murmurs, rubs, clicks or gallops, normal bilateral carotid upstroke without bruits, no JVD  Abdomen - soft, nontender, nondistended, no masses or organomegaly  bowel sounds normal  Neurological - alert, oriented, normal speech, no focal findings or movement disorder noted  Extremities - no pedal edema noted, intact peripheral pulses, no edema, redness or tenderness in the calves or thighs  Skin - normal coloration and turgor, no rashes      JACOB Jama - NP  12/5/2023

## 2024-01-19 ENCOUNTER — OFFICE VISIT (OUTPATIENT)
Age: 44
End: 2024-01-19
Payer: COMMERCIAL

## 2024-01-19 VITALS
SYSTOLIC BLOOD PRESSURE: 114 MMHG | TEMPERATURE: 98.2 F | HEIGHT: 64 IN | HEART RATE: 76 BPM | BODY MASS INDEX: 28.85 KG/M2 | OXYGEN SATURATION: 97 % | DIASTOLIC BLOOD PRESSURE: 77 MMHG | WEIGHT: 169 LBS | RESPIRATION RATE: 14 BRPM

## 2024-01-19 DIAGNOSIS — F32.1 CURRENT MODERATE EPISODE OF MAJOR DEPRESSIVE DISORDER WITHOUT PRIOR EPISODE (HCC): Primary | ICD-10-CM

## 2024-01-19 DIAGNOSIS — K59.09 CHRONIC CONSTIPATION: ICD-10-CM

## 2024-01-19 PROCEDURE — 99214 OFFICE O/P EST MOD 30 MIN: CPT | Performed by: NURSE PRACTITIONER

## 2024-01-19 RX ORDER — VENLAFAXINE HYDROCHLORIDE 75 MG/1
75 CAPSULE, EXTENDED RELEASE ORAL DAILY
Qty: 90 CAPSULE | Refills: 0 | Status: SHIPPED | OUTPATIENT
Start: 2024-01-19

## 2024-01-19 RX ORDER — VENLAFAXINE HYDROCHLORIDE 37.5 MG/1
37.5 CAPSULE, EXTENDED RELEASE ORAL DAILY
Qty: 10 CAPSULE | Refills: 0 | Status: SHIPPED | OUTPATIENT
Start: 2024-01-19

## 2024-01-19 NOTE — PROGRESS NOTES
Chief Complaint   Patient presents with    Depression    Abdominal Pain     Gastroenterology Cj       1. Have you been to the ER, urgent care clinic since your last visit?  Hospitalized since your last visit?No    2. Have you seen or consulted any other health care providers outside of the [facility] since your last visit?  Include any pap smears or colon screening. Yes Where: Gastrology    
bilateral carotid upstroke without bruits, no JVD  Abdomen - soft, nontender, nondistended, no masses or organomegaly  bowel sounds normal  Neurological - alert, oriented, normal speech, no focal findings or movement disorder noted  Extremities - no pedal edema noted, intact peripheral pulses, no edema, redness or tenderness in the calves or thighs  Skin - normal coloration and turgor, no rashes        Jr Chambers, JACOB - NP  1/19/2024

## 2024-03-15 ENCOUNTER — OFFICE VISIT (OUTPATIENT)
Age: 44
End: 2024-03-15
Payer: COMMERCIAL

## 2024-03-15 VITALS
OXYGEN SATURATION: 98 % | WEIGHT: 169.8 LBS | DIASTOLIC BLOOD PRESSURE: 86 MMHG | HEART RATE: 80 BPM | HEIGHT: 64 IN | SYSTOLIC BLOOD PRESSURE: 124 MMHG | BODY MASS INDEX: 28.99 KG/M2 | TEMPERATURE: 98.6 F

## 2024-03-15 DIAGNOSIS — F32.1 CURRENT MODERATE EPISODE OF MAJOR DEPRESSIVE DISORDER WITHOUT PRIOR EPISODE (HCC): ICD-10-CM

## 2024-03-15 PROCEDURE — 99213 OFFICE O/P EST LOW 20 MIN: CPT | Performed by: NURSE PRACTITIONER

## 2024-03-15 RX ORDER — PLECANATIDE 3 MG/1
90 TABLET ORAL
COMMUNITY
Start: 2023-12-29

## 2024-03-15 RX ORDER — VENLAFAXINE HYDROCHLORIDE 75 MG/1
75 CAPSULE, EXTENDED RELEASE ORAL DAILY
Qty: 90 CAPSULE | Refills: 1 | Status: SHIPPED | OUTPATIENT
Start: 2024-03-15

## 2024-03-15 ASSESSMENT — PATIENT HEALTH QUESTIONNAIRE - PHQ9
7. TROUBLE CONCENTRATING ON THINGS, SUCH AS READING THE NEWSPAPER OR WATCHING TELEVISION: 0
SUM OF ALL RESPONSES TO PHQ QUESTIONS 1-9: 0
8. MOVING OR SPEAKING SO SLOWLY THAT OTHER PEOPLE COULD HAVE NOTICED. OR THE OPPOSITE, BEING SO FIGETY OR RESTLESS THAT YOU HAVE BEEN MOVING AROUND A LOT MORE THAN USUAL: 0
1. LITTLE INTEREST OR PLEASURE IN DOING THINGS: 0
SUM OF ALL RESPONSES TO PHQ QUESTIONS 1-9: 0
4. FEELING TIRED OR HAVING LITTLE ENERGY: 0
SUM OF ALL RESPONSES TO PHQ QUESTIONS 1-9: 0
SUM OF ALL RESPONSES TO PHQ QUESTIONS 1-9: 0
10. IF YOU CHECKED OFF ANY PROBLEMS, HOW DIFFICULT HAVE THESE PROBLEMS MADE IT FOR YOU TO DO YOUR WORK, TAKE CARE OF THINGS AT HOME, OR GET ALONG WITH OTHER PEOPLE: 0
9. THOUGHTS THAT YOU WOULD BE BETTER OFF DEAD, OR OF HURTING YOURSELF: 0
2. FEELING DOWN, DEPRESSED OR HOPELESS: 0
6. FEELING BAD ABOUT YOURSELF - OR THAT YOU ARE A FAILURE OR HAVE LET YOURSELF OR YOUR FAMILY DOWN: 0
SUM OF ALL RESPONSES TO PHQ9 QUESTIONS 1 & 2: 0
5. POOR APPETITE OR OVEREATING: 0
3. TROUBLE FALLING OR STAYING ASLEEP: 0

## 2024-09-13 ENCOUNTER — TRANSCRIBE ORDERS (OUTPATIENT)
Facility: HOSPITAL | Age: 44
End: 2024-09-13

## 2024-09-13 ENCOUNTER — HOSPITAL ENCOUNTER (OUTPATIENT)
Facility: HOSPITAL | Age: 44
Discharge: HOME | End: 2024-09-16
Payer: COMMERCIAL

## 2024-09-13 VITALS — HEIGHT: 64 IN | BODY MASS INDEX: 28.85 KG/M2 | WEIGHT: 169 LBS

## 2024-09-13 DIAGNOSIS — Z12.31 OTHER SCREENING MAMMOGRAM: Primary | ICD-10-CM

## 2024-09-13 DIAGNOSIS — Z12.31 OTHER SCREENING MAMMOGRAM: ICD-10-CM

## 2024-09-13 PROCEDURE — 77063 BREAST TOMOSYNTHESIS BI: CPT

## 2024-09-19 ENCOUNTER — OFFICE VISIT (OUTPATIENT)
Age: 44
End: 2024-09-19
Payer: COMMERCIAL

## 2024-09-19 VITALS
HEIGHT: 64 IN | SYSTOLIC BLOOD PRESSURE: 113 MMHG | HEART RATE: 85 BPM | OXYGEN SATURATION: 98 % | WEIGHT: 161 LBS | DIASTOLIC BLOOD PRESSURE: 80 MMHG | RESPIRATION RATE: 19 BRPM | BODY MASS INDEX: 27.49 KG/M2 | TEMPERATURE: 98.2 F

## 2024-09-19 DIAGNOSIS — F32.A ANXIETY AND DEPRESSION: Primary | ICD-10-CM

## 2024-09-19 DIAGNOSIS — F41.9 ANXIETY AND DEPRESSION: Primary | ICD-10-CM

## 2024-09-19 PROCEDURE — 99213 OFFICE O/P EST LOW 20 MIN: CPT | Performed by: NURSE PRACTITIONER

## 2024-09-19 RX ORDER — BUPROPION HYDROCHLORIDE 150 MG/1
150 TABLET ORAL EVERY MORNING
Qty: 90 TABLET | Refills: 1 | Status: SHIPPED | OUTPATIENT
Start: 2024-09-19

## 2024-10-14 ENCOUNTER — OFFICE VISIT (OUTPATIENT)
Age: 44
End: 2024-10-14
Payer: COMMERCIAL

## 2024-10-14 VITALS
DIASTOLIC BLOOD PRESSURE: 79 MMHG | OXYGEN SATURATION: 100 % | WEIGHT: 165 LBS | HEART RATE: 75 BPM | BODY MASS INDEX: 28.17 KG/M2 | RESPIRATION RATE: 20 BRPM | TEMPERATURE: 98.2 F | HEIGHT: 64 IN | SYSTOLIC BLOOD PRESSURE: 114 MMHG

## 2024-10-14 DIAGNOSIS — R21 RASH: Primary | ICD-10-CM

## 2024-10-14 LAB
ALBUMIN SERPL-MCNC: 3.4 G/DL (ref 3.5–5)
ALBUMIN/GLOB SERPL: 1.1 (ref 1.1–2.2)
ALP SERPL-CCNC: 68 U/L (ref 45–117)
ALT SERPL-CCNC: 29 U/L (ref 12–78)
ANION GAP SERPL CALC-SCNC: 5 MMOL/L (ref 2–12)
AST SERPL-CCNC: 20 U/L (ref 15–37)
BILIRUB SERPL-MCNC: 0.4 MG/DL (ref 0.2–1)
BUN SERPL-MCNC: 12 MG/DL (ref 6–20)
BUN/CREAT SERPL: 23 (ref 12–20)
CALCIUM SERPL-MCNC: 8.9 MG/DL (ref 8.5–10.1)
CHLORIDE SERPL-SCNC: 111 MMOL/L (ref 97–108)
CO2 SERPL-SCNC: 24 MMOL/L (ref 21–32)
CREAT SERPL-MCNC: 0.53 MG/DL (ref 0.55–1.02)
ERYTHROCYTE [DISTWIDTH] IN BLOOD BY AUTOMATED COUNT: 14.4 % (ref 11.5–14.5)
GLOBULIN SER CALC-MCNC: 3 G/DL (ref 2–4)
GLUCOSE SERPL-MCNC: 93 MG/DL (ref 65–100)
HCT VFR BLD AUTO: 39.4 % (ref 35–47)
HGB BLD-MCNC: 12.9 G/DL (ref 11.5–16)
MCH RBC QN AUTO: 29.7 PG (ref 26–34)
MCHC RBC AUTO-ENTMCNC: 32.7 G/DL (ref 30–36.5)
MCV RBC AUTO: 90.8 FL (ref 80–99)
NRBC # BLD: 0 K/UL (ref 0–0.01)
NRBC BLD-RTO: 0 PER 100 WBC
PLATELET # BLD AUTO: 279 K/UL (ref 150–400)
PMV BLD AUTO: 10.7 FL (ref 8.9–12.9)
POTASSIUM SERPL-SCNC: 3.9 MMOL/L (ref 3.5–5.1)
PROT SERPL-MCNC: 6.4 G/DL (ref 6.4–8.2)
RBC # BLD AUTO: 4.34 M/UL (ref 3.8–5.2)
SODIUM SERPL-SCNC: 140 MMOL/L (ref 136–145)
WBC # BLD AUTO: 5.2 K/UL (ref 3.6–11)

## 2024-10-14 PROCEDURE — 99213 OFFICE O/P EST LOW 20 MIN: CPT | Performed by: PHYSICIAN ASSISTANT

## 2024-10-14 RX ORDER — METHYLPREDNISOLONE 4 MG
TABLET, DOSE PACK ORAL
Qty: 21 TABLET | Refills: 0 | Status: SHIPPED | OUTPATIENT
Start: 2024-10-14 | End: 2024-10-20

## 2024-10-14 ASSESSMENT — PATIENT HEALTH QUESTIONNAIRE - PHQ9
6. FEELING BAD ABOUT YOURSELF - OR THAT YOU ARE A FAILURE OR HAVE LET YOURSELF OR YOUR FAMILY DOWN: NOT AT ALL
10. IF YOU CHECKED OFF ANY PROBLEMS, HOW DIFFICULT HAVE THESE PROBLEMS MADE IT FOR YOU TO DO YOUR WORK, TAKE CARE OF THINGS AT HOME, OR GET ALONG WITH OTHER PEOPLE: NOT DIFFICULT AT ALL
2. FEELING DOWN, DEPRESSED OR HOPELESS: NOT AT ALL
1. LITTLE INTEREST OR PLEASURE IN DOING THINGS: NOT AT ALL
SUM OF ALL RESPONSES TO PHQ QUESTIONS 1-9: 0
SUM OF ALL RESPONSES TO PHQ QUESTIONS 1-9: 0
5. POOR APPETITE OR OVEREATING: NOT AT ALL
SUM OF ALL RESPONSES TO PHQ9 QUESTIONS 1 & 2: 0
SUM OF ALL RESPONSES TO PHQ QUESTIONS 1-9: 0
9. THOUGHTS THAT YOU WOULD BE BETTER OFF DEAD, OR OF HURTING YOURSELF: NOT AT ALL
7. TROUBLE CONCENTRATING ON THINGS, SUCH AS READING THE NEWSPAPER OR WATCHING TELEVISION: NOT AT ALL
8. MOVING OR SPEAKING SO SLOWLY THAT OTHER PEOPLE COULD HAVE NOTICED. OR THE OPPOSITE, BEING SO FIGETY OR RESTLESS THAT YOU HAVE BEEN MOVING AROUND A LOT MORE THAN USUAL: NOT AT ALL
3. TROUBLE FALLING OR STAYING ASLEEP: NOT AT ALL
4. FEELING TIRED OR HAVING LITTLE ENERGY: NOT AT ALL
SUM OF ALL RESPONSES TO PHQ QUESTIONS 1-9: 0

## 2024-10-14 NOTE — PROGRESS NOTES
Jenni Ahumada is a 44 y.o. female , id x 2(name and ). Reviewed record, history, and  medications.      Chief Complaint   Patient presents with    Skin Problem     Patient c/o rash on both arms and stomach, itchy, burning, fluid filled, x2 weeks.          Vitals:    10/14/24 0731   Weight: 74.8 kg (165 lb)   Height: 1.626 m (5' 4\")             10/14/2024     7:33 AM   PHQ-9    Little interest or pleasure in doing things 0   Feeling down, depressed, or hopeless 0   Trouble falling or staying asleep, or sleeping too much 0   Feeling tired or having little energy 0   Poor appetite or overeating 0   Feeling bad about yourself - or that you are a failure or have let yourself or your family down 0   Trouble concentrating on things, such as reading the newspaper or watching television 0   Moving or speaking so slowly that other people could have noticed. Or the opposite - being so fidgety or restless that you have been moving around a lot more than usual 0   Thoughts that you would be better off dead, or of hurting yourself in some way 0   PHQ-2 Score 0   PHQ-9 Total Score 0   If you checked off any problems, how difficult have these problems made it for you to do your work, take care of things at home, or get along with other people? 0            No data to display                Social Determinants of Health     Tobacco Use: Low Risk  (2024)    Patient History     Smoking Tobacco Use: Never     Smokeless Tobacco Use: Never     Passive Exposure: Not on file   Alcohol Use: Not on file   Financial Resource Strain: Low Risk  (2023)    Overall Financial Resource Strain (CARDIA)     Difficulty of Paying Living Expenses: Not hard at all   Food Insecurity: No Food Insecurity (2023)    Hunger Vital Sign     Worried About Running Out of Food in the Last Year: Never true     Ran Out of Food in the Last Year: Never true   Transportation Needs: Unknown (2023)    PRAPARE - Transportation     Lack of 
for Release of Records Request

## 2025-03-24 DIAGNOSIS — F32.A ANXIETY AND DEPRESSION: ICD-10-CM

## 2025-03-24 DIAGNOSIS — F41.9 ANXIETY AND DEPRESSION: ICD-10-CM

## 2025-03-24 RX ORDER — BUPROPION HYDROCHLORIDE 150 MG/1
150 TABLET ORAL EVERY MORNING
Qty: 90 TABLET | Refills: 1 | Status: SHIPPED | OUTPATIENT
Start: 2025-03-24

## 2025-04-15 ENCOUNTER — APPOINTMENT (OUTPATIENT)
Facility: HOSPITAL | Age: 45
End: 2025-04-15
Payer: COMMERCIAL

## 2025-04-15 ENCOUNTER — HOSPITAL ENCOUNTER (EMERGENCY)
Facility: HOSPITAL | Age: 45
Discharge: HOME | End: 2025-04-15
Payer: COMMERCIAL

## 2025-04-15 VITALS
DIASTOLIC BLOOD PRESSURE: 80 MMHG | OXYGEN SATURATION: 100 % | RESPIRATION RATE: 18 BRPM | HEART RATE: 82 BPM | HEIGHT: 64 IN | WEIGHT: 175 LBS | TEMPERATURE: 98.1 F | SYSTOLIC BLOOD PRESSURE: 131 MMHG | BODY MASS INDEX: 29.88 KG/M2

## 2025-04-15 DIAGNOSIS — M25.512 CHRONIC LEFT SHOULDER PAIN: Primary | ICD-10-CM

## 2025-04-15 DIAGNOSIS — G89.29 CHRONIC LEFT SHOULDER PAIN: Primary | ICD-10-CM

## 2025-04-15 PROCEDURE — 96372 THER/PROPH/DIAG INJ SC/IM: CPT

## 2025-04-15 PROCEDURE — 6360000002 HC RX W HCPCS

## 2025-04-15 PROCEDURE — 99285 EMERGENCY DEPT VISIT HI MDM: CPT

## 2025-04-15 PROCEDURE — 73030 X-RAY EXAM OF SHOULDER: CPT

## 2025-04-15 RX ORDER — LIDOCAINE 50 MG/G
1 PATCH TOPICAL DAILY
Qty: 10 PATCH | Refills: 0 | Status: SHIPPED | OUTPATIENT
Start: 2025-04-15 | End: 2025-04-25

## 2025-04-15 RX ORDER — KETOROLAC TROMETHAMINE 30 MG/ML
30 INJECTION, SOLUTION INTRAMUSCULAR; INTRAVENOUS
Status: COMPLETED | OUTPATIENT
Start: 2025-04-15 | End: 2025-04-15

## 2025-04-15 RX ORDER — METHOCARBAMOL 750 MG/1
750 TABLET, FILM COATED ORAL 4 TIMES DAILY
Qty: 40 TABLET | Refills: 0 | Status: SHIPPED | OUTPATIENT
Start: 2025-04-15 | End: 2025-04-25

## 2025-04-15 RX ADMIN — KETOROLAC TROMETHAMINE 30 MG: 30 INJECTION, SOLUTION INTRAMUSCULAR at 15:22

## 2025-04-15 ASSESSMENT — PAIN DESCRIPTION - DESCRIPTORS: DESCRIPTORS: ACHING

## 2025-04-15 ASSESSMENT — PAIN DESCRIPTION - LOCATION: LOCATION: SHOULDER

## 2025-04-15 ASSESSMENT — PAIN - FUNCTIONAL ASSESSMENT: PAIN_FUNCTIONAL_ASSESSMENT: 0-10

## 2025-04-15 ASSESSMENT — PAIN DESCRIPTION - ORIENTATION: ORIENTATION: LEFT

## 2025-04-15 ASSESSMENT — PAIN SCALES - GENERAL
PAINLEVEL_OUTOF10: 6
PAINLEVEL_OUTOF10: 6

## 2025-04-15 NOTE — ED PROVIDER NOTES
[facility] EMERGENCY DEPARTMENT  EMERGENCY DEPARTMENT ENCOUNTER         Pt Name: Jenni Ahumada  MRN: [id number]  Birthdate 1980  Date of evaluation: 4/15/2025  Provider: Felicita Mascorro PA-C   PCP: Aleja Taylor APRN - CNP  Note Started: 4:08 PM EDT 4/15/25     CHIEF COMPLAINT       Chief Complaint   Patient presents with    Shoulder Pain        HISTORY OF PRESENT ILLNESS: 1 or more elements      History From: Patient  HPI Limitations: None  Arrival Mode:     Jenni Ahumada is a 44 y.o. female who presents with left shoulder pain that been ongoing for about a month ever since she injured it at work.  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  Patient states that about a month ago when the boxes started to slip towards her and bent her shoulder backwards.  Ever since then her shoulder has not been the same.  She has full range of motion in the shoulder but with great difficulty in abduction.  There is no physical deformity noted to the shoulder.  No crepitus on exam.  Patient's physical exam suggestive for possible rotator cuff tear.  Patient has been taking Tylenol for her symptoms.     Nursing Notes were all reviewed and agreed with or any disagreements were addressed in the HPI.  Please see MDM for additional details of HPI and ROS     REVIEW OF SYSTEMS      Review of Systems   Musculoskeletal:  Positive for arthralgias and myalgias.   All other systems reviewed and are negative.       Positives and Pertinent negatives as per HPI.    PAST HISTORY     Past Medical History:  Past Medical History:   Diagnosis Date    Anemia     Constipation     Endometrial polyp 10/10/2022    benign    History of tuberculosis 1993    Rx with pills/cleared  (last CXR 2019 negative)    Routine Papanicolaou smear 01/26/2023    normal HPV neg    Screening mammogram for breast cancer 01/26/2023    BIRADS 1: Negative. No mammographic evidence of malignancy       Past Surgical History:  Past

## 2025-04-15 NOTE — ED TRIAGE NOTES
Pt presents with left shoulder pain that originates in her acromioclavicular region and labrum, and radiates to her fingertips x 1 year that has worsened over the past x 1 month. Pt has been taking ibuprofen and Tylenol with minimal relief.

## 2025-04-15 NOTE — ED NOTES
Discharge instructions were given to the patient by MARY LOU Mascorro.  The patient left the Emergency Department alert and oriented and in no acute distress with 2 prescription(s). The patient was encouraged to call or return to the ED for worsening issues or problems and was encouraged to schedule a follow up appointment for continuing care.  The patient verbalized understanding of discharge instructions and prescriptions; all questions were answered. The patient has no further concerns at this time.

## 2025-04-15 NOTE — ED NOTES
Pt presents to ED via walk-in complaining of left shoulder pain, +PMS and strength equal bilaterally. Pt is alert and oriented x 4, RR even and unlabored, skin is warm and dry. Pt appears in NAD at this time. Assessment completed and pt updated on plan of care.  Call bell in reach.   Emergency Department Nursing Plan of Care  The Nursing Plan of Care is developed from the Nursing assessment and Emergency Department Attending provider initial evaluation.  The plan of care may be reviewed in the “ED Provider note”.  The Plan of Care was developed with the following considerations:  Patient / Family readiness to learn indicated by:Refer to Medical chart in [facility]  Persons(s) to be included in education: Refer to Medical chart in [facility]  Barriers to Learning/Limitations:Normal

## (undated) DEVICE — PERI GYN-SFMC: Brand: MEDLINE INDUSTRIES, INC.

## (undated) DEVICE — INTENDED FOR TISSUE SEPARATION, AND OTHER PROCEDURES THAT REQUIRE A SHARP SURGICAL BLADE TO PUNCTURE OR CUT.: Brand: BARD-PARKER ® CARBON RIB-BACK BLADES

## (undated) DEVICE — HANDPIECE ABLAT DISP FOR ENDOMET SYS

## (undated) DEVICE — SYR 10ML CTRL LR LCK NSAF LF --

## (undated) DEVICE — DEVICE TISS REMOVAL -- ORDER AS BX     APO CODE = DRP

## (undated) DEVICE — HYPODERMIC SAFETY NEEDLE: Brand: MAGELLAN

## (undated) DEVICE — FLUID MGMT SYS FLUENT KIT 6/PK

## (undated) DEVICE — BLADE ASSEMB CLP HAIR FINE --

## (undated) DEVICE — SUT VCRL + 2-0 27IN SH UD --

## (undated) DEVICE — TOWEL OR BL STR 4/PK -- MEDICHOICE - MEDLINE

## (undated) DEVICE — ROCKER SWITCH PENCIL BLADE ELECTRODE, HOLSTER: Brand: EDGE

## (undated) DEVICE — SOL INJ SOD CL0.9% 10ML PREFIL -- SUB B-D306546Z 30/BX $6.00

## (undated) DEVICE — GLOVE ORANGE PI 7   MSG9070

## (undated) DEVICE — SET SEALS HYSTEROSCOPE DISP -- MYOSURE  EA=10

## (undated) DEVICE — TUBING, SUCTION, 1/4" X 10', STRAIGHT: Brand: MEDLINE

## (undated) DEVICE — SOLUTION IRRIG 3000ML 0.9% SOD CHL USP UROMATIC PLAS CONT

## (undated) DEVICE — SOLUTION IRRIG 1000ML 0.9% SOD CHL USP POUR PLAS BTL

## (undated) DEVICE — REM POLYHESIVE ADULT PATIENT RETURN ELECTRODE: Brand: VALLEYLAB

## (undated) DEVICE — CYSTO/BLADDER IRRIGATION SET, REGULATING CLAMP

## (undated) DEVICE — GLOVE ORTHO 7   MSG9470

## (undated) DEVICE — TOTAL TRAY, 16FR 10ML SIL FOLEY, URN: Brand: MEDLINE

## (undated) DEVICE — SOLUTION IRRIGATION H2O 0797305] ICU MEDICAL INC]

## (undated) DEVICE — TOTAL TRAY, 18FR 10ML SIL FOLEY, URN: Brand: MEDLINE